# Patient Record
Sex: FEMALE | Race: BLACK OR AFRICAN AMERICAN | NOT HISPANIC OR LATINO | Employment: OTHER | URBAN - METROPOLITAN AREA
[De-identification: names, ages, dates, MRNs, and addresses within clinical notes are randomized per-mention and may not be internally consistent; named-entity substitution may affect disease eponyms.]

---

## 2017-01-12 ENCOUNTER — PATIENT MESSAGE (OUTPATIENT)
Dept: HEPATOLOGY | Facility: CLINIC | Age: 57
End: 2017-01-12

## 2017-02-14 ENCOUNTER — PATIENT MESSAGE (OUTPATIENT)
Dept: HEPATOLOGY | Facility: CLINIC | Age: 57
End: 2017-02-14

## 2017-02-27 ENCOUNTER — PATIENT MESSAGE (OUTPATIENT)
Dept: HEPATOLOGY | Facility: CLINIC | Age: 57
End: 2017-02-27

## 2017-03-01 ENCOUNTER — HISTORICAL (OUTPATIENT)
Dept: RADIOLOGY | Facility: HOSPITAL | Age: 57
End: 2017-03-01

## 2017-03-01 ENCOUNTER — PATIENT MESSAGE (OUTPATIENT)
Dept: HEPATOLOGY | Facility: CLINIC | Age: 57
End: 2017-03-01

## 2017-03-01 LAB
ALBUMIN/GLOB SERPL ELPH: 1.2 {RATIO}
ALBUMIN: 3.9
ALP ISOS SERPL LEV INH-CCNC: 102 U/L
ALT SERPL-CCNC: 59 U/L
AST SERPL-CCNC: 30 U/L
BASOPHILS ABSOLUTE COUNT: 0 /ΜL
BASOPHILS NFR BLD: 0 % (ref 0–3)
BILIRUB CONJ+UNCONJ SERPL-MCNC: 0.2 MG/DL
BILIRUBIN DIRECT+TOT PNL SERPL-MCNC: 0.1 MG/DL (ref 0.01–0.4)
BILIRUBIN, TOTAL: 0.3
BUN BLD-MCNC: 15 MG/DL (ref 4–21)
CALCIUM SERPL-MCNC: 9.1 MG/DL
CHLORIDE: 105
CO2 SERPL-SCNC: 27 MMOL/L
CREAT SERPL-MCNC: 0.7 MG/DL
EOSINOPHIL NFR BLD: 8 %
EOSINOPHILS ABSOLUTE COUNT: 1 /ΜL
ERYTHROCYTE [DISTWIDTH] IN BLOOD BY AUTOMATED COUNT: 13.6 % (ref 11.5–14.5)
EST. GFR  (AFRICAN AMERICAN): >60
EST. GFR  (NON AFRICAN AMERICAN): 60 MG/DL
GLOBULIN: 3.3
GLUCOSE: 109 (ref 14–106)
HCT VFR BLD AUTO: 40 % (ref 36–46)
HGB BLD-MCNC: 13 G/DL (ref 12–16)
LYMPHOCYTES %: 54 % (ref 18–52)
LYMPHOCYTES ABSOLUTE COUNT: 3 /ΜL
MCH RBC QN AUTO: 29.6 PG (ref 26–34)
MCHC RBC AUTO-ENTMCNC: 33 G/DL (ref 30–37)
MCV RBC AUTO: 90 FL (ref 82–108)
MONOCYTES %: 5
MONOCYTES ABSOLUTE COUNT: 0.3
NEUTROPHILS ABSOLUTE COUNT: 2 /ΜL (ref 2–9)
NEUTROPHILS ABSOLUTE COUNT: 2 /ΜL (ref 2–9)
NEUTROPHILS RELATIVE PERCENT: 33 % (ref 46–78)
PLATELET # BLD AUTO: 338 K/ΜL (ref 150–399)
PMV BLD AUTO: 8.6 FL (ref 9.4–12.4)
POTASSIUM: 4.3
PROT SERPL-MCNC: 7.2 G/DL
RBC # BLD AUTO: 4.39 10^6/ΜL (ref 4–5.2)
SODIUM: 142
WBC # BLD AUTO: 5.8 10^3/ML

## 2017-03-02 ENCOUNTER — TELEPHONE (OUTPATIENT)
Dept: HEPATOLOGY | Facility: CLINIC | Age: 57
End: 2017-03-02

## 2017-03-02 LAB — AFP-TUMOR MARKER: 3.3

## 2017-03-02 NOTE — TELEPHONE ENCOUNTER
Patient sent over information to be added to chart.     Hep A and B Injection    1st Injection- 05/17/2012  2nd Injection- 06/18/2012  3rd Injection- 11/16/2012     Injection received @ [facility].   Copy sent to scanning.

## 2017-03-02 NOTE — TELEPHONE ENCOUNTER
----- Message from Bell Quintanilla MD sent at 3/2/2017 12:50 AM CST -----  Labs stable. Inform patient

## 2017-03-03 ENCOUNTER — TELEPHONE (OUTPATIENT)
Dept: HEPATOLOGY | Facility: CLINIC | Age: 57
End: 2017-03-03

## 2017-03-03 NOTE — TELEPHONE ENCOUNTER
----- Message from Bell Quintanilla MD sent at 3/3/2017  3:50 AM CST -----  Please inform patient results are OK.

## 2017-03-06 ENCOUNTER — TELEPHONE (OUTPATIENT)
Dept: HEPATOLOGY | Facility: CLINIC | Age: 57
End: 2017-03-06

## 2017-03-06 ENCOUNTER — PATIENT MESSAGE (OUTPATIENT)
Dept: HEPATOLOGY | Facility: CLINIC | Age: 57
End: 2017-03-06

## 2017-03-06 NOTE — TELEPHONE ENCOUNTER
Reviewed ultrasound report dated March 1, 2017:  Please inform patient his ultrasound showed fatty liver, no tumor in the liver, blood flow to the liver is appropriate, gallbladder is unremarkable, spleen is normal size, both kidneys were normal size.

## 2017-03-06 NOTE — TELEPHONE ENCOUNTER
MA called patient back, inform patient of her US results     Bell Quintanilla MD   3/6/17 9:04 AM   Note      Reviewed ultrasound report dated March 1, 2017:  Please inform patient his ultrasound showed fatty liver, no tumor in the liver, blood flow to the liver is appropriate, gallbladder is unremarkable, spleen is normal size, both kidneys were normal size.           Patient understood and verbalized understanding.DON

## 2017-03-06 NOTE — TELEPHONE ENCOUNTER
----- Message from Karissa Flores sent at 3/6/2017 10:08 AM CST -----  Contact: pt  Wants to know results of US and other tests from last week please call her asap @ # [phone].

## 2017-03-09 ENCOUNTER — TELEPHONE (OUTPATIENT)
Dept: HEPATOLOGY | Facility: CLINIC | Age: 57
End: 2017-03-09

## 2017-03-09 NOTE — TELEPHONE ENCOUNTER
Sent message below to patient.     Slight increase in tumor marker is not significant.  Once a year tumor marker measurement and ultrasound should be sufficient. Your test showed bridging fibrosis which is one notch below cirrhosis, so it does not need to be done every 6 months.  It was my oversight and I asked for it at 6 months after the last. But next one will be a year after your last one, in March 2018.      So we will do AFP and u/s once a year, next will be in March 2018. Pl flag her chart.

## 2017-03-30 ENCOUNTER — PATIENT MESSAGE (OUTPATIENT)
Dept: HEPATOLOGY | Facility: CLINIC | Age: 57
End: 2017-03-30

## 2017-05-15 ENCOUNTER — HISTORICAL (OUTPATIENT)
Dept: LAB | Facility: HOSPITAL | Age: 57
End: 2017-05-15

## 2017-07-31 ENCOUNTER — TELEPHONE (OUTPATIENT)
Dept: HEPATOLOGY | Facility: CLINIC | Age: 57
End: 2017-07-31

## 2017-07-31 NOTE — TELEPHONE ENCOUNTER
MA called patient back, schedule her follow up visit [facility] location, patient need labs and US done before her follow up visit.     Mailed appt reminder, labs and US order to patient. Peña

## 2017-07-31 NOTE — TELEPHONE ENCOUNTER
----- Message from Neda Worrell sent at 7/31/2017  8:28 AM CDT -----  Contact: patient   Patient calling to schedule her appt with [facility] in [place]. Please call  or call

## 2017-09-21 ENCOUNTER — HISTORICAL (OUTPATIENT)
Dept: RADIOLOGY | Facility: HOSPITAL | Age: 57
End: 2017-09-21

## 2017-09-21 LAB
BASOPHILS ABSOLUTE COUNT: 0 /ΜL
BASOPHILS NFR BLD: 0 % (ref 0–3)
EOSINOPHIL NFR BLD: 10 %
EOSINOPHILS ABSOLUTE COUNT: 1 /ΜL
ERYTHROCYTE [DISTWIDTH] IN BLOOD BY AUTOMATED COUNT: 13.7 %
HCT VFR BLD AUTO: 41 % (ref 36–46)
HGB BLD-MCNC: 13.2 G/DL (ref 12–16)
LYMPHOCYTES %: 49 % (ref 18–52)
LYMPHOCYTES ABSOLUTE COUNT: 3 /ΜL
MCH RBC QN AUTO: 29.1 PG
MCHC RBC AUTO-ENTMCNC: 32.4 G/DL
MCV RBC AUTO: 89.9 FL
MONOCYTES %: 6
MONOCYTES ABSOLUTE COUNT: 0.4
NEUTROPHILS - MAN (DIFF): 34
NEUTROPHILS ABSOLUTE COUNT: 2 /ΜL
NEUTROPHILS ABSOLUTE COUNT: 2 /ΜL
PLATELET # BLD AUTO: 320 K/ΜL (ref 150–399)
PMV BLD AUTO: 8.3 FL (ref 7.5–11.5)
RBC # BLD AUTO: 4.54 10^6/ΜL (ref 4–5.2)
WBC: 5.6

## 2017-09-25 ENCOUNTER — TELEPHONE (OUTPATIENT)
Dept: HEPATOLOGY | Facility: CLINIC | Age: 57
End: 2017-09-25

## 2017-09-25 LAB
ALBUMIN/GLOB SERPL ELPH: 1.1 {RATIO}
ALBUMIN: 3.8
ALP ISOS SERPL LEV INH-CCNC: 100 U/L
ALT SERPL-CCNC: 49 U/L
AST SERPL-CCNC: 27 U/L
BILIRUB CONJ+UNCONJ SERPL-MCNC: 0.2 MG/DL
BILIRUBIN DIRECT+TOT PNL SERPL-MCNC: 0.1 MG/DL (ref 0.01–0.4)
BILIRUBIN, TOTAL: 0.3
BUN BLD-MCNC: 15 MG/DL (ref 4–21)
CALCIUM SERPL-MCNC: 9.4 MG/DL
CARBON DIOXIDE, CO2: 26
CHLORIDE: 107
CREAT SERPL-MCNC: 0.7 MG/DL
GFR MDRD AF AMER: >60
GFR MDRD NON AF AMER: >60
GLOBULIN: 3.4
GLUCOSE: 144
INR PPP: 1.1 (ref 2–3)
POTASSIUM: 4.2
PROT SERPL-MCNC: 7.2 G/DL
PROTHROMBIN TIME: 14 S
SODIUM: 141

## 2017-09-25 NOTE — TELEPHONE ENCOUNTER
----- Message from Bell Quintanilla MD sent at 9/25/2017  3:55 PM CDT -----  Please inform patient:  Liver enzyme is minimally elevated, but slightly lower than previously.  No change needed

## 2017-09-28 ENCOUNTER — OFFICE VISIT (OUTPATIENT)
Dept: HEPATOLOGY | Facility: CLINIC | Age: 57
End: 2017-09-28
Payer: COMMERCIAL

## 2017-09-28 VITALS
BODY MASS INDEX: 34.21 KG/M2 | DIASTOLIC BLOOD PRESSURE: 93 MMHG | RESPIRATION RATE: 18 BRPM | HEIGHT: 69 IN | SYSTOLIC BLOOD PRESSURE: 135 MMHG | WEIGHT: 231 LBS

## 2017-09-28 DIAGNOSIS — K75.81 NONALCOHOLIC STEATOHEPATITIS: Primary | ICD-10-CM

## 2017-09-28 PROCEDURE — 3008F BODY MASS INDEX DOCD: CPT | Mod: S$GLB,,, | Performed by: INTERNAL MEDICINE

## 2017-09-28 PROCEDURE — 99212 OFFICE O/P EST SF 10 MIN: CPT | Mod: S$GLB,,, | Performed by: INTERNAL MEDICINE

## 2017-09-28 NOTE — PROGRESS NOTES
Subjective:       Patient ID: Jessie Greene is a 56 y.o. female.    Chief Complaint: Follow-up      HPI:     CORBETT, sec to DM.  on insulin.  Had bridging fibrosis in the liver.  Has been on HCC surveillance once a year.  AFP normal. U/s done on 9/12/16: report not sent to me.      Symptoms are occasional muscular pains involving shoulders and chest.  Occasional right upper quadrant pain with sometimes shoots downwards towards her lower abdomen, sometimes left upper quadrant pain radiating downwards to the left lower abdomen.  No constipation       Review of Systems   Constitutional: Negative for appetite change, fatigue and unexpected weight change.   HENT: Negative for dental problem, nosebleeds, sore throat and trouble swallowing.    Eyes: Negative for visual disturbance.   Respiratory: Negative for cough and shortness of breath.    Cardiovascular: Negative for leg swelling.   Gastrointestinal: Negative for abdominal distention and diarrhea.   Genitourinary: Negative for decreased urine volume and frequency.   Musculoskeletal: Positive for myalgias. Negative for joint swelling.   Skin: Negative for color change and rash.   Neurological: Negative for dizziness and weakness.   Hematological: Does not bruise/bleed easily.   Psychiatric/Behavioral: Negative for confusion and decreased concentration. The patient is not nervous/anxious.        Objective:      Physical Exam   Constitutional: She is oriented to person, place, and time. Vital signs are normal. She appears well-developed and well-nourished.   HENT:   Head: Normocephalic.   Eyes: Conjunctivae are normal. No scleral icterus.   Neck: No JVD present.   Abdominal: Soft. She exhibits no distension.   Musculoskeletal: Normal range of motion. She exhibits no edema.   Neurological: She is alert and oriented to person, place, and time. Coordination normal.   Skin: No rash noted.   Psychiatric: She has a normal mood and affect. Her behavior is normal. Judgment  and thought content normal.       Assessment:    CORBETT with bridging fibrosis.- possible early cirrhosis.  Platelet count remains normal.   On HCC surveillance, getting AFP and U/ q 1 year.   Recent U/s showed no mass in the liver.    Alpha-fetoprotein normal   Neutropenia - 1.88, doubt it is from liver disease, as platelet count still normal.  Could be from viral illness.        Plan:   1.  PCP follow-up for control of blood glucose.    2.  Continue low carbohydrate diet, and regular exercise.     3.  Have blood count repeated from PCP to see if neutropenia resolved.  3.  AFP and u/s q 1 year.  Next in September 2018.  4.  Return 1 yr

## 2017-09-28 NOTE — PATIENT INSTRUCTIONS
1.  PCP follow-up for control of blood glucose.    2.  Continue low carbohydrate diet, and regular exercise.     3.  Have blood count repeated from PCP to see if neutropenia resolved.  3.  AFP and u/s q 1 year.  Next in September 2018.  4.  Return 1 yr

## 2018-04-30 ENCOUNTER — PATIENT MESSAGE (OUTPATIENT)
Dept: HEPATOLOGY | Facility: CLINIC | Age: 58
End: 2018-04-30

## 2018-05-06 ENCOUNTER — HISTORICAL (OUTPATIENT)
Dept: LAB | Facility: HOSPITAL | Age: 58
End: 2018-05-06

## 2018-07-05 ENCOUNTER — PATIENT MESSAGE (OUTPATIENT)
Dept: HEPATOLOGY | Facility: CLINIC | Age: 58
End: 2018-07-05

## 2018-07-10 ENCOUNTER — TELEPHONE (OUTPATIENT)
Dept: HEPATOLOGY | Facility: CLINIC | Age: 58
End: 2018-07-10

## 2018-07-10 DIAGNOSIS — K75.81 NASH (NONALCOHOLIC STEATOHEPATITIS): Primary | ICD-10-CM

## 2018-07-13 ENCOUNTER — TELEPHONE (OUTPATIENT)
Dept: HEPATOLOGY | Facility: CLINIC | Age: 58
End: 2018-07-13

## 2018-07-13 NOTE — TELEPHONE ENCOUNTER
----- Message from Yara Fiore sent at 7/13/2018 10:50 AM CDT -----  Contact: [employer] message  Appointment Request From: Jessie Greene    With Provider: Bell Quintanilla MD [[place] - Hepatology]    Would Accept With:Only the person I've selected    Preferred Date Range: From 9/3/2018 To 9/14/2018    Preferred Times: Monday Morning, Tuesday Morning, Wednesday Morning, Thursday Morning, Friday Morning    Reason for visit: Request an Appt    Comments:  Yearly visit

## 2018-07-13 NOTE — TELEPHONE ENCOUNTER
MA attempted to call patient back, she is unable to reached left her Vm to please give us a callback.

## 2018-07-16 ENCOUNTER — PATIENT MESSAGE (OUTPATIENT)
Dept: HEPATOLOGY | Facility: CLINIC | Age: 58
End: 2018-07-16

## 2018-07-16 ENCOUNTER — TELEPHONE (OUTPATIENT)
Dept: HEPATOLOGY | Facility: CLINIC | Age: 58
End: 2018-07-16

## 2018-08-06 ENCOUNTER — TELEPHONE (OUTPATIENT)
Dept: HEPATOLOGY | Facility: CLINIC | Age: 58
End: 2018-08-06

## 2018-08-06 NOTE — TELEPHONE ENCOUNTER
----- Message from Kalani New sent at 8/6/2018  8:13 AM CDT -----  Contact: Chery diaz/Dimitris Henley [phone]  Needs orders for US of abdomen faxed to [phone]/pt scheduled for tomorrow

## 2018-08-07 ENCOUNTER — HISTORICAL (OUTPATIENT)
Dept: RADIOLOGY | Facility: HOSPITAL | Age: 58
End: 2018-08-07

## 2018-08-07 LAB
AFP-TUMOR MARKER: 3.2
ALBUMIN/GLOB SERPL ELPH: 1 {RATIO}
ALBUMIN: 3.9
ALP SERPL-CCNC: 92 U/L (ref 25–125)
ALT SERPL W P-5'-P-CCNC: 59 U/L (ref 7–35)
AST SERPL-CCNC: 32 U/L (ref 13–35)
BASOPHILS NFR BLD: 0 % (ref 0–3)
BILIRUB CONJ+UNCONJ SERPL-MCNC: 0.2 MG/DL
BILIRUB SERPL-MCNC: 0.3 MG/DL (ref 0.1–1.4)
BILIRUBIN DIRECT+TOT PNL SERPL-MCNC: 0.1 MG/DL (ref 0.01–0.4)
BUN SERPL-MCNC: 14 MG/DL
CALCIUM SERPL-MCNC: 9.2 MG/DL (ref 8.7–10.7)
CHLORIDE SERPL-SCNC: 106 MMOL/L (ref 99–108)
CO2 SERPL-SCNC: 27 MMOL/L (ref 13–22)
CREAT SERPL-MCNC: 0.8 MG/DL (ref 0.5–1.1)
EGFR IF AFRICAN AMERICAN: >60
EOSINOPHIL NFR BLD: 3 %
ERYTHROCYTE [DISTWIDTH] IN BLOOD BY AUTOMATED COUNT: 14.1 %
EST. GFR  (NON AFRICAN AMERICAN): 60 MG/DL
GLOBULIN: 4
GLUCOSE SERPL-MCNC: 138 MG/DL
HCT VFR BLD AUTO: 41 % (ref 36–46)
HGB BLD-MCNC: 13.4 G/DL (ref 12–16)
INR PPP: 1.1 (ref 2–3)
LYMPHOCYTES - MAN (DIFF): 56
MCH RBC QN AUTO: 29.8 PG
MCHC RBC AUTO-ENTMCNC: 32.8 G/DL
MCV RBC AUTO: 91.1 FL
MONOCYTES %: 4
NEUTROPHILS ABSOLUTE COUNT: 2 /ΜL
PLATELET # BLD AUTO: 315 K/ΜL (ref 150–399)
PMV BLD AUTO: 8.5 FL (ref 7.5–11.5)
POTASSIUM SERPL-SCNC: 4.3 MMOL/L (ref 3.4–5.3)
PT: 11
RBC MORPH BLD: NORMAL
RBC: 4.49
SMALL PLATELETS BLD QL SMEAR: ADEQUATE
TICARCILLIN+CLAVULANATE [SUSCEPTIBILITY] BY DISK DIFFUSION (KB): 37 % (ref 36–72)
TOTAL PROTEIN: 7.5 G/DL (ref 6.4–8.2)
WBC: 6.4

## 2018-08-09 ENCOUNTER — TELEPHONE (OUTPATIENT)
Dept: HEPATOLOGY | Facility: CLINIC | Age: 58
End: 2018-08-09

## 2018-08-09 NOTE — TELEPHONE ENCOUNTER
----- Message from Rody Ramon sent at 8/9/2018  9:24 AM CDT -----      ----- Message -----  From: Bell Quintanilla MD  Sent: 8/9/2018   7:50 AM  To: [facility]    Labs stable. No change needed.

## 2018-08-09 NOTE — TELEPHONE ENCOUNTER
----- Message from Bell Quintanilla MD sent at 8/9/2018  7:51 AM CDT -----  Tumor marker normal. Pl inform pt.

## 2018-11-28 ENCOUNTER — TELEPHONE (OUTPATIENT)
Dept: HEPATOLOGY | Facility: CLINIC | Age: 58
End: 2018-11-28

## 2018-11-28 NOTE — TELEPHONE ENCOUNTER
Reviewed abdominal ultrasound dated August 7, 2018:  Liver enlarged, no focal liver lesions seen. Spleen normal in size gallbladder free of stones.  No fluid in the abdomen.    Please inform patient ultrasound did not show any significant abnormalities.

## 2019-01-31 ENCOUNTER — HISTORICAL (OUTPATIENT)
Dept: LAB | Facility: HOSPITAL | Age: 59
End: 2019-01-31

## 2019-05-18 ENCOUNTER — HISTORICAL (OUTPATIENT)
Dept: LAB | Facility: HOSPITAL | Age: 59
End: 2019-05-18

## 2019-05-18 LAB
EST. AVERAGE GLUCOSE BLD GHB EST-MCNC: 169 MG/DL
HBA1C MFR BLD: 7.5 % (ref 4.2–6.3)

## 2019-05-20 ENCOUNTER — PATIENT MESSAGE (OUTPATIENT)
Dept: HEPATOLOGY | Facility: CLINIC | Age: 59
End: 2019-05-20

## 2019-05-21 ENCOUNTER — PATIENT MESSAGE (OUTPATIENT)
Dept: HEPATOLOGY | Facility: CLINIC | Age: 59
End: 2019-05-21

## 2019-05-28 ENCOUNTER — HISTORICAL (OUTPATIENT)
Dept: RADIOLOGY | Facility: HOSPITAL | Age: 59
End: 2019-05-28

## 2019-05-28 ENCOUNTER — HISTORICAL (OUTPATIENT)
Dept: ADMINISTRATIVE | Facility: HOSPITAL | Age: 59
End: 2019-05-28

## 2019-05-28 LAB
ABS NEUT (OLG): 3.19 X10(3)/MCL (ref 2.1–9.2)
ALBUMIN SERPL-MCNC: 3.7 GM/DL (ref 3.4–5)
ALBUMIN/GLOB SERPL: 1.1 {RATIO}
ALP SERPL-CCNC: 120 UNIT/L (ref 38–126)
ALT SERPL-CCNC: 49 UNIT/L (ref 12–78)
AST SERPL-CCNC: 25 UNIT/L (ref 15–37)
BASOPHILS # BLD AUTO: 0 X10(3)/MCL (ref 0–0.2)
BASOPHILS NFR BLD AUTO: 0 %
BILIRUB SERPL-MCNC: 0.4 MG/DL (ref 0.2–1)
BILIRUBIN DIRECT+TOT PNL SERPL-MCNC: 0.1 MG/DL (ref 0–0.2)
BILIRUBIN DIRECT+TOT PNL SERPL-MCNC: 0.3 MG/DL (ref 0–0.8)
BUN SERPL-MCNC: 19 MG/DL (ref 7–18)
CALCIUM SERPL-MCNC: 9.2 MG/DL (ref 8.5–10.1)
CHLORIDE SERPL-SCNC: 105 MMOL/L (ref 98–107)
CHOLEST SERPL-MCNC: 103 MG/DL (ref 0–200)
CHOLEST/HDLC SERPL: 2.9 {RATIO} (ref 0–4)
CO2 SERPL-SCNC: 26 MMOL/L (ref 21–32)
CREAT SERPL-MCNC: 1.08 MG/DL (ref 0.55–1.02)
EOSINOPHIL # BLD AUTO: 0.5 X10(3)/MCL (ref 0–0.9)
EOSINOPHIL NFR BLD AUTO: 7 %
ERYTHROCYTE [DISTWIDTH] IN BLOOD BY AUTOMATED COUNT: 13.6 % (ref 11.5–17)
GLOBULIN SER-MCNC: 3.5 GM/DL (ref 2.4–3.5)
GLUCOSE SERPL-MCNC: 108 MG/DL (ref 74–106)
HCT VFR BLD AUTO: 40.3 % (ref 37–47)
HDLC SERPL-MCNC: 35 MG/DL (ref 35–60)
HGB BLD-MCNC: 12.5 GM/DL (ref 12–16)
INR PPP: 1 (ref 0–1.3)
LDLC SERPL CALC-MCNC: 48 MG/DL (ref 0–129)
LYMPHOCYTES # BLD AUTO: 3.2 X10(3)/MCL (ref 0.6–4.6)
LYMPHOCYTES NFR BLD AUTO: 43 %
MCH RBC QN AUTO: 29 PG (ref 27–31)
MCHC RBC AUTO-ENTMCNC: 31 GM/DL (ref 33–36)
MCV RBC AUTO: 93.5 FL (ref 80–94)
MONOCYTES # BLD AUTO: 0.5 X10(3)/MCL (ref 0.1–1.3)
MONOCYTES NFR BLD AUTO: 7 %
NEUTROPHILS # BLD AUTO: 3.19 X10(3)/MCL (ref 2.1–9.2)
NEUTROPHILS NFR BLD AUTO: 43 %
PLATELET # BLD AUTO: 377 X10(3)/MCL (ref 130–400)
PMV BLD AUTO: 8.6 FL (ref 9.4–12.4)
POTASSIUM SERPL-SCNC: 4.2 MMOL/L (ref 3.5–5.1)
PROT SERPL-MCNC: 7.2 GM/DL (ref 6.4–8.2)
PROTHROMBIN TIME: 13.7 SECOND(S) (ref 12–14)
RBC # BLD AUTO: 4.31 X10(6)/MCL (ref 4.2–5.4)
SODIUM SERPL-SCNC: 139 MMOL/L (ref 136–145)
TRIGL SERPL-MCNC: 101 MG/DL (ref 30–150)
VLDLC SERPL CALC-MCNC: 20 MG/DL
WBC # SPEC AUTO: 7.4 X10(3)/MCL (ref 4.5–11.5)

## 2019-05-31 ENCOUNTER — TELEPHONE (OUTPATIENT)
Dept: HEPATOLOGY | Facility: CLINIC | Age: 59
End: 2019-05-31

## 2019-05-31 LAB
AFP: 2.6
ALBUMIN/GLOB SERPL ELPH: 1.1 {RATIO}
ALBUMIN: 3.7
ALP ISOS SERPL LEV INH-CCNC: 120 U/L
ALT SERPL-CCNC: 49 U/L
AST: 25
BASOPHILS ABSOLUTE COUNT: 0 /?L
BASOPHILS NFR BLD: 0 % (ref 0–3)
BILIRUB CONJ+UNCONJ SERPL-MCNC: 0.3 MG/DL
BILIRUBIN DIRECT+TOT PNL SERPL-MCNC: 0.1 MG/DL (ref 0.01–0.4)
BILIRUBIN TOTAL: 0.4
BUN BLD-MCNC: 19 MG/DL (ref 4–21)
CALCIUM SERPL-MCNC: 9.2 MG/DL
CARBON DIOXIDE, CO2: 26
CHLORIDE: 105
CHOL/HDLC RATIO: 2.9
CHOLEST SERPL-MSCNC: 103 MG/DL (ref 0–200)
CREAT SERPL-MCNC: 1.1 MG/DL
EGFR IF AFRICAN AMERICAN: >60
EOSINOPHIL NFR BLD: 7 %
EOSINOPHILS ABSOLUTE COUNT: 1 /?L
ERYTHROCYTE [DISTWIDTH] IN BLOOD BY AUTOMATED COUNT: 13.6 %
EST. GFR  (NON AFRICAN AMERICAN): 56 MG/DL
GLOBULIN: 3.5
GLUCOSE: 108
HCT: 40.3
HDLC SERPL-MCNC: 35 MG/DL
HGB BLD-MCNC: 12.5 G/DL (ref 12–16)
INR PPP: 1 (ref 2–3)
LDLC SERPL CALC-MCNC: 48 MG/DL
LYMPHOCYTES %: 43 % (ref 18–52)
LYMPHOCYTES ABSOLUTE COUNT: 3 /?L
MCH RBC QN AUTO: 29 PG
MCHC RBC AUTO-ENTMCNC: 31 G/DL
MCV RBC AUTO: 93.5 FL
MONOCYTES %: 7
MONOCYTES ABSOLUTE COUNT: 0.5
NEUTROPHILS - ABS (DIFF): 3 /ΜL
NEUTROPHILS - MAN (DIFF): 43
NEUTROPHILS ABSOLUTE COUNT: 3 /?L
PLATELET COUNT: 377
PMV BLD AUTO: 8.6 FL (ref 7.5–11.5)
POTASSIUM: 4.2
PROTEIN TOTAL: 7.2
PT: 13.7
RBC # BLD AUTO: 4.31 10*6/UL
SODIUM: 139
TRIGL SERPL-MCNC: 101 MG/DL
VLDL CHOLESTEROL: 20 MG/DL
WBC: 7.4

## 2019-05-31 NOTE — TELEPHONE ENCOUNTER
----- Message from Bell Quintanilla MD sent at 5/31/2019 12:59 PM CDT -----  Please inform patient results are OK.

## 2019-06-03 ENCOUNTER — PATIENT MESSAGE (OUTPATIENT)
Dept: HEPATOLOGY | Facility: CLINIC | Age: 59
End: 2019-06-03

## 2019-06-06 ENCOUNTER — PATIENT MESSAGE (OUTPATIENT)
Dept: HEPATOLOGY | Facility: CLINIC | Age: 59
End: 2019-06-06

## 2019-06-09 ENCOUNTER — TELEPHONE (OUTPATIENT)
Dept: HEPATOLOGY | Facility: CLINIC | Age: 59
End: 2019-06-09

## 2019-06-09 NOTE — TELEPHONE ENCOUNTER
Reviewed u/s 5/28/19  No tumor in the liver.  Continue surveillance for liver cancer every 6 months with u/s and AFP, next due 11/2019.    Also, let patient know:  Pancreatic duct prominent 3 mm. Have patient contact PCP to get further evaluation of this finding.

## 2019-07-11 ENCOUNTER — HISTORICAL (OUTPATIENT)
Dept: RADIOLOGY | Facility: HOSPITAL | Age: 59
End: 2019-07-11

## 2019-07-12 ENCOUNTER — PATIENT MESSAGE (OUTPATIENT)
Dept: HEPATOLOGY | Facility: CLINIC | Age: 59
End: 2019-07-12

## 2019-08-13 ENCOUNTER — PATIENT MESSAGE (OUTPATIENT)
Dept: HEPATOLOGY | Facility: CLINIC | Age: 59
End: 2019-08-13

## 2019-09-26 ENCOUNTER — OFFICE VISIT (OUTPATIENT)
Dept: HEPATOLOGY | Facility: CLINIC | Age: 59
End: 2019-09-26
Payer: COMMERCIAL

## 2019-09-26 VITALS
HEIGHT: 69 IN | WEIGHT: 220 LBS | RESPIRATION RATE: 18 BRPM | BODY MASS INDEX: 32.58 KG/M2 | SYSTOLIC BLOOD PRESSURE: 119 MMHG | HEART RATE: 77 BPM | DIASTOLIC BLOOD PRESSURE: 73 MMHG

## 2019-09-26 DIAGNOSIS — K75.81 NASH (NONALCOHOLIC STEATOHEPATITIS): Primary | ICD-10-CM

## 2019-09-26 PROCEDURE — 99214 PR OFFICE/OUTPT VISIT, EST, LEVL IV, 30-39 MIN: ICD-10-PCS | Mod: S$GLB,,, | Performed by: INTERNAL MEDICINE

## 2019-09-26 PROCEDURE — 99214 OFFICE O/P EST MOD 30 MIN: CPT | Mod: S$GLB,,, | Performed by: INTERNAL MEDICINE

## 2019-09-26 RX ORDER — CLONAZEPAM 0.5 MG/1
0.5 TABLET ORAL 2 TIMES DAILY PRN
COMMUNITY
End: 2022-05-03

## 2019-09-26 RX ORDER — FLUTICASONE FUROATE AND VILANTEROL 100; 25 UG/1; UG/1
1 POWDER RESPIRATORY (INHALATION) DAILY
COMMUNITY
End: 2022-05-03

## 2019-09-26 RX ORDER — TELMISARTAN 80 MG/1
40 TABLET ORAL DAILY
COMMUNITY
End: 2022-05-03

## 2019-09-26 RX ORDER — BUPROPION HYDROCHLORIDE 150 MG/1
150 TABLET ORAL DAILY
COMMUNITY
End: 2022-07-05

## 2019-09-26 RX ORDER — FLUOXETINE HYDROCHLORIDE 40 MG/1
40 CAPSULE ORAL 2 TIMES DAILY
COMMUNITY

## 2019-09-26 NOTE — Clinical Note
-  Continue low carbohydrate diet, and regular exercise.   -  Have blood count today, as we did not get plt ct last time. -  CBC, CMP q 6 months, next in November 2019.   -  AFP and u/s q 1 year.  Next in May 2020.-  Return 1 yr

## 2019-09-26 NOTE — PATIENT INSTRUCTIONS
-  Continue low carbohydrate diet, and regular exercise.     -  Have blood count today, as we did not get plt ct last time.   -  CBC, CMP q 6 months, next in November 2019.     -  AFP and u/s q 1 year.  Next in May 2020.  -  Return 1 yr

## 2019-10-01 ENCOUNTER — TELEPHONE (OUTPATIENT)
Dept: HEPATOLOGY | Facility: CLINIC | Age: 59
End: 2019-10-01

## 2019-10-01 LAB
AGE: 57
ALBUMIN: 4
ALP ISOS SERPL LEV INH-CCNC: 116 U/L
ALT: 38
ANION GAP SERPL CALC-SCNC: 11 MMOL/L
AST: 20
BASOPHILS ABSOLUTE COUNT: 0 /?L
BASOPHILS NFR BLD: 1 % (ref 0–3)
BUN BLD-MCNC: 16 MG/DL (ref 4–21)
CALCIUM SERPL-MCNC: 9.8 MG/DL
CARBON DIOXIDE, CO2: 24
CHLORIDE: 105
CREAT SERPL-MCNC: 1 MG/DL
EGFR IF AFRICAN AMERICAN: 60
EOS COUNT ABSOLUTE: 1 /ΜL
EOSINOPHIL NFR BLD: 8.2 %
EST. GFR  (NON AFRICAN AMERICAN): 56 MG/DL
GFR MDRD AF AMER: 68
GFR: 56
GLUCOSE: 184
HCT: 41.6
HGB: 13.6
IMMATURE GRANS (ABS): 0.02
IMMATURE GRANULOCYTES: 0.3
LYMPH %: 46.1
LYMPHOCYTES ABSOLUTE COUNT: 3 /?L
MCH RBC QN AUTO: 30.7 PG
MCHC RBC AUTO-ENTMCNC: 32.7 G/DL
MCV RBC AUTO: 93.9 FL
MONO %: 5.2
MONOCYTES ABSOLUTE COUNT: 0.32
NEUTROPHILS ABSOLUTE COUNT: 2 /?L
NEUTROPHILS NFR BLD: 39.6 %
NRBC %: 0
PLATELET # BLD AUTO: 345 K/?L (ref 150–399)
POTASSIUM: 3.87
RBC # BLD AUTO: 4.43 10*6/UL
RDW-CV: 13.6
RDW-SD: 47
SODIUM: 140
TOTAL BILIRUBIN POC: 0.3
TOTAL PROTEIN: 7.5 G/DL (ref 6.4–8.2)
WBC: 6.21

## 2019-10-01 NOTE — TELEPHONE ENCOUNTER
----- Message from Bell Quintanilla MD sent at 10/1/2019  3:14 PM CDT -----  Please inform patient results are OK.

## 2019-10-27 ENCOUNTER — PATIENT MESSAGE (OUTPATIENT)
Dept: HEPATOLOGY | Facility: CLINIC | Age: 59
End: 2019-10-27

## 2019-11-06 ENCOUNTER — HISTORICAL (OUTPATIENT)
Dept: ADMINISTRATIVE | Facility: HOSPITAL | Age: 59
End: 2019-11-06

## 2019-11-15 ENCOUNTER — HISTORICAL (OUTPATIENT)
Dept: RADIOLOGY | Facility: HOSPITAL | Age: 59
End: 2019-11-15

## 2019-12-31 ENCOUNTER — HISTORICAL (OUTPATIENT)
Dept: CARDIOLOGY | Facility: HOSPITAL | Age: 59
End: 2019-12-31

## 2020-07-14 ENCOUNTER — HISTORICAL (OUTPATIENT)
Dept: ADMINISTRATIVE | Facility: HOSPITAL | Age: 60
End: 2020-07-14

## 2020-08-10 LAB — CRC RECOMMENDATION EXT: NORMAL

## 2020-08-12 ENCOUNTER — HISTORICAL (OUTPATIENT)
Dept: ADMINISTRATIVE | Facility: HOSPITAL | Age: 60
End: 2020-08-12

## 2020-10-02 ENCOUNTER — HISTORICAL (OUTPATIENT)
Dept: RADIOLOGY | Facility: HOSPITAL | Age: 60
End: 2020-10-02

## 2021-04-14 ENCOUNTER — HISTORICAL (OUTPATIENT)
Dept: LAB | Facility: HOSPITAL | Age: 61
End: 2021-04-14

## 2021-04-16 ENCOUNTER — HISTORICAL (OUTPATIENT)
Dept: RADIOLOGY | Facility: HOSPITAL | Age: 61
End: 2021-04-16

## 2021-11-11 ENCOUNTER — HISTORICAL (OUTPATIENT)
Dept: RADIOLOGY | Facility: HOSPITAL | Age: 61
End: 2021-11-11

## 2022-02-09 ENCOUNTER — HISTORICAL (OUTPATIENT)
Dept: ADMINISTRATIVE | Facility: HOSPITAL | Age: 62
End: 2022-02-09

## 2022-02-09 LAB
ABS NEUT (OLG): 2.21 (ref 2.1–9.2)
ALBUMIN SERPL-MCNC: 3.7 G/DL (ref 3.4–4.8)
ALBUMIN/GLOB SERPL: 1.2 {RATIO} (ref 1.1–2)
ALP SERPL-CCNC: 97 U/L (ref 40–150)
ALT SERPL-CCNC: 60 U/L (ref 0–55)
AST SERPL-CCNC: 41 U/L (ref 5–34)
BASOPHILS # BLD AUTO: 0 10*3/UL (ref 0–0.2)
BASOPHILS NFR BLD AUTO: 0.5 %
BILIRUB SERPL-MCNC: 0.3 MG/DL
BILIRUBIN DIRECT+TOT PNL SERPL-MCNC: 0.1 (ref 0–0.8)
BILIRUBIN DIRECT+TOT PNL SERPL-MCNC: 0.2 (ref 0–0.5)
BUN SERPL-MCNC: 18 MG/DL (ref 9.8–20.1)
CALCIUM SERPL-MCNC: 9.6 MG/DL (ref 8.7–10.5)
CHLORIDE SERPL-SCNC: 105 MMOL/L (ref 98–107)
CO2 SERPL-SCNC: 26 MMOL/L (ref 23–31)
CREAT SERPL-MCNC: 0.97 MG/DL (ref 0.55–1.02)
EOSINOPHIL # BLD AUTO: 0.5 10*3/UL (ref 0–0.9)
EOSINOPHIL NFR BLD AUTO: 8.2 %
ERYTHROCYTE [DISTWIDTH] IN BLOOD BY AUTOMATED COUNT: 14.6 % (ref 11.5–17)
GLOBULIN SER-MCNC: 3 G/DL (ref 2.4–3.5)
GLUCOSE SERPL-MCNC: 151 MG/DL (ref 82–115)
HCT VFR BLD AUTO: 40.9 % (ref 37–47)
HEMOLYSIS INTERF INDEX SERPL-ACNC: 3
HGB BLD-MCNC: 12.9 G/DL (ref 12–16)
ICTERIC INTERF INDEX SERPL-ACNC: 0
LIPEMIC INTERF INDEX SERPL-ACNC: 1
LYMPHOCYTES # BLD AUTO: 2.8 10*3/UL (ref 0.6–4.6)
LYMPHOCYTES NFR BLD AUTO: 48.3 %
MANUAL DIFF? (OHS): NO
MCH RBC QN AUTO: 29.3 PG (ref 27–31)
MCHC RBC AUTO-ENTMCNC: 31.5 G/DL (ref 33–36)
MCV RBC AUTO: 92.7 FL (ref 80–94)
MONOCYTES # BLD AUTO: 0.2 10*3/UL (ref 0.1–1.3)
MONOCYTES NFR BLD AUTO: 4.3 %
NEUTROPHILS # BLD AUTO: 2.2 10*3/UL (ref 2.1–9.2)
NEUTROPHILS NFR BLD AUTO: 38.4 %
PLATELET # BLD AUTO: 326 10*3/UL (ref 130–400)
PMV BLD AUTO: 8.1 FL (ref 9.4–12.4)
POTASSIUM SERPL-SCNC: 4 MMOL/L (ref 3.5–5.1)
PROT SERPL-MCNC: 6.7 G/DL (ref 5.8–7.6)
RBC # BLD AUTO: 4.41 10*6/UL (ref 4.2–5.4)
SODIUM SERPL-SCNC: 142 MMOL/L (ref 136–145)
TSH SERPL-ACNC: 2.03 M[IU]/L (ref 0.35–4.94)
WBC # SPEC AUTO: 5.8 10*3/UL (ref 4.5–11.5)

## 2022-04-07 ENCOUNTER — HISTORICAL (OUTPATIENT)
Dept: ADMINISTRATIVE | Facility: HOSPITAL | Age: 62
End: 2022-04-07
Payer: COMMERCIAL

## 2022-04-23 VITALS
HEIGHT: 69 IN | WEIGHT: 207 LBS | OXYGEN SATURATION: 98 % | DIASTOLIC BLOOD PRESSURE: 70 MMHG | BODY MASS INDEX: 30.66 KG/M2 | SYSTOLIC BLOOD PRESSURE: 112 MMHG

## 2022-04-25 ENCOUNTER — HISTORICAL (OUTPATIENT)
Dept: ADMINISTRATIVE | Facility: HOSPITAL | Age: 62
End: 2022-04-25
Payer: COMMERCIAL

## 2022-04-27 DIAGNOSIS — C50.511 CARCINOMA OF LOWER OUTER QUADRANT OF BREAST, RIGHT: Primary | ICD-10-CM

## 2022-04-28 ENCOUNTER — HISTORICAL (OUTPATIENT)
Dept: ANESTHESIOLOGY | Facility: HOSPITAL | Age: 62
End: 2022-04-28
Payer: COMMERCIAL

## 2022-04-28 ENCOUNTER — HISTORICAL (OUTPATIENT)
Dept: ADMINISTRATIVE | Facility: HOSPITAL | Age: 62
End: 2022-04-28
Payer: COMMERCIAL

## 2022-05-02 ENCOUNTER — LAB VISIT (OUTPATIENT)
Dept: LAB | Facility: HOSPITAL | Age: 62
End: 2022-05-02
Payer: COMMERCIAL

## 2022-05-02 DIAGNOSIS — C50.511 CARCINOMA OF LOWER OUTER QUADRANT OF BREAST, RIGHT: ICD-10-CM

## 2022-05-02 DIAGNOSIS — C50.919 BREAST CANCER: ICD-10-CM

## 2022-05-02 LAB
ALP SERPL-CCNC: 100 UNIT/L (ref 40–150)
ALT SERPL-CCNC: 34 UNIT/L (ref 0–55)
AST SERPL-CCNC: 29 UNIT/L (ref 5–34)

## 2022-05-02 PROCEDURE — 84460 ALANINE AMINO (ALT) (SGPT): CPT

## 2022-05-02 PROCEDURE — 36415 COLL VENOUS BLD VENIPUNCTURE: CPT

## 2022-05-02 PROCEDURE — 84075 ASSAY ALKALINE PHOSPHATASE: CPT

## 2022-05-02 PROCEDURE — 84450 TRANSFERASE (AST) (SGOT): CPT

## 2022-05-03 DIAGNOSIS — C50.919 BREAST CANCER: Primary | ICD-10-CM

## 2022-05-03 PROBLEM — Z15.89 MONOALLELIC MUTATION OF ATM GENE: Status: ACTIVE | Noted: 2022-05-03

## 2022-05-03 PROBLEM — K57.92 DIVERTICULITIS: Status: ACTIVE | Noted: 2022-05-03

## 2022-05-03 PROBLEM — I10 HYPERTENSION: Status: ACTIVE | Noted: 2022-05-03

## 2022-05-03 PROBLEM — Z15.01 MONOALLELIC MUTATION OF ATM GENE: Status: ACTIVE | Noted: 2022-05-03

## 2022-05-03 PROBLEM — J45.909 ASTHMA: Status: ACTIVE | Noted: 2022-05-03

## 2022-05-03 PROBLEM — C50.519 MALIGNANT NEOPLASM OF LOWER-OUTER QUADRANT OF FEMALE BREAST: Status: ACTIVE | Noted: 2022-05-03

## 2022-05-03 PROBLEM — Z79.811 AROMATASE INHIBITOR USE: Status: ACTIVE | Noted: 2022-05-03

## 2022-05-03 PROBLEM — E04.1 THYROID NODULE: Status: ACTIVE | Noted: 2022-05-03

## 2022-05-03 PROBLEM — Z15.09 MONOALLELIC MUTATION OF ATM GENE: Status: ACTIVE | Noted: 2022-05-03

## 2022-05-03 RX ORDER — INSULIN GLARGINE AND LIXISENATIDE 100; 33 U/ML; UG/ML
INJECTION, SOLUTION SUBCUTANEOUS
COMMUNITY
Start: 2022-03-19 | End: 2023-11-08

## 2022-05-03 RX ORDER — LOSARTAN POTASSIUM 50 MG/1
25 TABLET ORAL DAILY
COMMUNITY
Start: 2022-02-22 | End: 2023-11-08

## 2022-05-03 RX ORDER — ASPIRIN 325 MG
50000 TABLET, DELAYED RELEASE (ENTERIC COATED) ORAL
COMMUNITY
Start: 2021-12-27 | End: 2022-07-05

## 2022-05-03 RX ORDER — POLYETHYLENE GLYCOL 3350 17 G/17G
17 POWDER, FOR SOLUTION ORAL
COMMUNITY
End: 2022-07-05

## 2022-05-03 RX ORDER — ANASTROZOLE 1 MG/1
1 TABLET ORAL
COMMUNITY
Start: 2021-04-27 | End: 2022-05-04 | Stop reason: SINTOL

## 2022-05-03 RX ORDER — KETOCONAZOLE 20 MG/G
CREAM TOPICAL
COMMUNITY
Start: 2021-08-23 | End: 2023-11-08

## 2022-05-04 ENCOUNTER — OFFICE VISIT (OUTPATIENT)
Dept: HEMATOLOGY/ONCOLOGY | Facility: CLINIC | Age: 62
End: 2022-05-04
Payer: COMMERCIAL

## 2022-05-04 VITALS
DIASTOLIC BLOOD PRESSURE: 75 MMHG | SYSTOLIC BLOOD PRESSURE: 107 MMHG | HEART RATE: 64 BPM | OXYGEN SATURATION: 98 % | BODY MASS INDEX: 32.88 KG/M2 | WEIGHT: 222 LBS | HEIGHT: 69 IN | TEMPERATURE: 98 F

## 2022-05-04 DIAGNOSIS — C50.511 MALIGNANT NEOPLASM OF LOWER-OUTER QUADRANT OF RIGHT BREAST OF FEMALE, ESTROGEN RECEPTOR POSITIVE: Primary | ICD-10-CM

## 2022-05-04 DIAGNOSIS — Z15.09 MONOALLELIC MUTATION OF ATM GENE: ICD-10-CM

## 2022-05-04 DIAGNOSIS — Z15.01 MONOALLELIC MUTATION OF ATM GENE: ICD-10-CM

## 2022-05-04 DIAGNOSIS — Z79.811 AROMATASE INHIBITOR USE: ICD-10-CM

## 2022-05-04 DIAGNOSIS — Z17.0 MALIGNANT NEOPLASM OF LOWER-OUTER QUADRANT OF RIGHT BREAST OF FEMALE, ESTROGEN RECEPTOR POSITIVE: Primary | ICD-10-CM

## 2022-05-04 DIAGNOSIS — Z15.89 MONOALLELIC MUTATION OF ATM GENE: ICD-10-CM

## 2022-05-04 PROBLEM — M89.9 DISORDER OF BONE: Status: ACTIVE | Noted: 2022-05-04

## 2022-05-04 PROBLEM — R68.89 INCREASED HEAD CIRCUMFERENCE: Status: ACTIVE | Noted: 2022-05-04

## 2022-05-04 PROBLEM — E88.01 ALPHA-1-ANTITRYPSIN DEFICIENCY: Status: ACTIVE | Noted: 2022-05-04

## 2022-05-04 PROCEDURE — 3008F PR BODY MASS INDEX (BMI) DOCUMENTED: ICD-10-PCS | Mod: CPTII,S$GLB,, | Performed by: INTERNAL MEDICINE

## 2022-05-04 PROCEDURE — 99214 OFFICE O/P EST MOD 30 MIN: CPT | Mod: S$GLB,,, | Performed by: INTERNAL MEDICINE

## 2022-05-04 PROCEDURE — 1160F PR REVIEW ALL MEDS BY PRESCRIBER/CLIN PHARMACIST DOCUMENTED: ICD-10-PCS | Mod: CPTII,S$GLB,, | Performed by: INTERNAL MEDICINE

## 2022-05-04 PROCEDURE — 1159F MED LIST DOCD IN RCRD: CPT | Mod: CPTII,S$GLB,, | Performed by: INTERNAL MEDICINE

## 2022-05-04 PROCEDURE — 99999 PR PBB SHADOW E&M-EST. PATIENT-LVL III: ICD-10-PCS | Mod: PBBFAC,,, | Performed by: INTERNAL MEDICINE

## 2022-05-04 PROCEDURE — 4010F ACE/ARB THERAPY RXD/TAKEN: CPT | Mod: CPTII,S$GLB,, | Performed by: INTERNAL MEDICINE

## 2022-05-04 PROCEDURE — 99999 PR PBB SHADOW E&M-EST. PATIENT-LVL III: CPT | Mod: PBBFAC,,, | Performed by: INTERNAL MEDICINE

## 2022-05-04 PROCEDURE — 99214 PR OFFICE/OUTPT VISIT, EST, LEVL IV, 30-39 MIN: ICD-10-PCS | Mod: S$GLB,,, | Performed by: INTERNAL MEDICINE

## 2022-05-04 PROCEDURE — 3074F SYST BP LT 130 MM HG: CPT | Mod: CPTII,S$GLB,, | Performed by: INTERNAL MEDICINE

## 2022-05-04 PROCEDURE — 3008F BODY MASS INDEX DOCD: CPT | Mod: CPTII,S$GLB,, | Performed by: INTERNAL MEDICINE

## 2022-05-04 PROCEDURE — 3078F PR MOST RECENT DIASTOLIC BLOOD PRESSURE < 80 MM HG: ICD-10-PCS | Mod: CPTII,S$GLB,, | Performed by: INTERNAL MEDICINE

## 2022-05-04 PROCEDURE — 3074F PR MOST RECENT SYSTOLIC BLOOD PRESSURE < 130 MM HG: ICD-10-PCS | Mod: CPTII,S$GLB,, | Performed by: INTERNAL MEDICINE

## 2022-05-04 PROCEDURE — 3078F DIAST BP <80 MM HG: CPT | Mod: CPTII,S$GLB,, | Performed by: INTERNAL MEDICINE

## 2022-05-04 PROCEDURE — 1160F RVW MEDS BY RX/DR IN RCRD: CPT | Mod: CPTII,S$GLB,, | Performed by: INTERNAL MEDICINE

## 2022-05-04 PROCEDURE — 1159F PR MEDICATION LIST DOCUMENTED IN MEDICAL RECORD: ICD-10-PCS | Mod: CPTII,S$GLB,, | Performed by: INTERNAL MEDICINE

## 2022-05-04 PROCEDURE — 4010F PR ACE/ARB THEARPY RXD/TAKEN: ICD-10-PCS | Mod: CPTII,S$GLB,, | Performed by: INTERNAL MEDICINE

## 2022-05-04 RX ORDER — EXEMESTANE 25 MG/1
25 TABLET ORAL DAILY
Qty: 30 TABLET | Refills: 2 | Status: SHIPPED | OUTPATIENT
Start: 2022-05-23 | End: 2022-07-05 | Stop reason: ALTCHOICE

## 2022-05-04 RX ORDER — CLONAZEPAM 1 MG/1
1 TABLET ORAL 2 TIMES DAILY PRN
COMMUNITY
End: 2022-07-05

## 2022-05-04 RX ORDER — PIOGLITAZONEHYDROCHLORIDE 30 MG/1
30 TABLET ORAL DAILY
COMMUNITY

## 2022-05-04 NOTE — PROGRESS NOTES
Heme/Onc Progress Note    PATIENT: Jessie Greene  MRN: [id number]  DATE: 5/4/2022    Chief Complaint:  Current Treatment: Current Treatment: Arimidex (2/27/20) changed from Femara (1/2/20) stopped 2/13/20 due to intolerability     Oncology History   05/04/2022:  Patient is here today for follow-up.  The patient was last seen by my nurse practitioner on April 12. She had a nuclear medicine bone scan scheduled.  That nuclear medicine bone scan is negative for malignant disease.  She still complains of significant joint pain and stiffness.  While it is better than the Femara she still has difficulty with activities of daily living.  She did not stop her Arimidex as was instructed on the last note.  She got confused.  She was seen by GI she underwent underwent EGD.  Was noted to have some polyps biopsies of which are pending.     Diagnosis T2N1a, Stage IIb, Right lower outer quadrant infiltrating ductal carcinoma, diagnosed 10/23/19     Treatment History:  10/21/19: 2.8 cm mass in the right breast 8 o'clock position highly suggestive of malignancy with additional hypoechoic areas at 12:00, 2:00 9:00 and 10:00 suspicious for multicentric disease   highly suspicious of malignancy.   10/23/19: Ultrasound-guided biopsy of right breast 2 different sites: right breast biopsy 8 o'clock position 2 cm from the nipple invasive ductal carcinoma low-grade at least 1.6 cm with   microcalcifications with DCIS  ER 93.9% ND 90.8% Ki-67 23.7% HER-2/quang not expressed by FISH   11/6/19: CT C/A/P and bone scan-No conclusive evidence of osseous metastatic disease sclerotic lesion seen in the pelvis and the sacrum on comparison CT did not have clearly associated   uptake on the current bone scan but may be obscured by radiotracer uptake in the bladder. Degenerative uptake at L3. Uptake at the frontal bones represents hyperostosis frontalis.  11/18/19: Bilateral mastectomy sentinel lymph node biopsy   Pathology: Invasive ductal  carcinoma   2 foci: A. = 2.3 cm at the 8 o'clock position B.= 0.15 cm G1, ER/HI positive, HER2 negative  Clear margins, Multiple intraductal papillomas  [place] lymph node 1 out of 2 positive measuring 1.2 cm   Left breast intraductal papillomas bilateral left sentinel lymph nodes negative.   Additional axillary content of the right and left without evidence of metastatic disease.  Summary: T2 N1 aM0 = right breast 1 out of 5 lymph nodes involved, negative margins  ER 93.9%, HI 90.8% HER-2/quang negative  ONCO-DX score 11   5/18/20: EGD,  and abdominal ultrasound by Dr. Schneider for pancreatic cancer screening   9/22/2020: Outside pathology report from primary care's office show metastatic breast carcinoma metastasis to the thyroid.   Due to the above. Recommend the patient have a repeat CBC, CMP, tumor marker CA-15-3 and CA-27-29, and a PET/CT for thyroid metastasis from breast carcinoma to restage her.   10/2/2020 PET CT scan, periportal lymph node measures 12 mm in short axis with SUV 2.6, no other hypermetabolic lymph nodes identified. No suspicious osseous metabolism. Minimally enlarged periportal lymph node is mild hypermetabolism but the size is stable going back to 2019 this lymph node is indeterminate.   Partial right thyroidectomy, nodule hyperplasia some of the nodules show fibrosis and cystic generation calcification, one attached unremarkable parathyroid gland and 1 parathyroid lymph node negative for tumor. No evidence of malignancy. The review of the original tumor show fragments of parathyroid tissue r   Clinical History:   This patient at the age of 58 was noted to have a palpable breast mass in 2017. She had normal mammograms 2017 and skipped her MMG in 2016. She had noticed the lesion grow slowly and then presented with a palpable breast mass underwent imaging October 21, 2019. And subsequent biopsy. She was then seen and evaluated by surgery. She underwent a CT scan of the chest abdomen  pelvis and bone scan. And discussed breast conservation surgery versus mastectomy. She underwent bilateral mastectomies with bilateral sentinel lymph node biopsy. And was sent to oncology for further evaluation on December 4, 2019.           05/04/2022  Past Medical History:   Diagnosis Date    Diabetes mellitus     Diverticulitis     Ectopic pregnancy     Fatty liver     Kidney stones         Current Outpatient Medications:     albuterol (PROVENTIL) 5 mg/mL nebulizer solution, Take 2.5 mg by nebulization every 6 (six) hours as needed for Wheezing., Disp: , Rfl:     anastrozole (ARIMIDEX) 1 mg Tab, Take 1 mg by mouth., Disp: , Rfl:     buPROPion (WELLBUTRIN XL) 150 MG TB24 tablet, Take 150 mg by mouth once daily., Disp: , Rfl:     clonazePAM (KLONOPIN) 1 MG tablet, Take 1 mg by mouth 2 (two) times daily as needed for Anxiety., Disp: , Rfl:     empagliflozin-metformin (SYNJARDY XR) 25-1,000 mg TBph, Take by mouth., Disp: , Rfl:     FLUoxetine 40 MG capsule, Take 40 mg by mouth once daily., Disp: , Rfl:     fluticasone-umeclidin-vilanter (TRELEGY ELLIPTA) 100-62.5-25 mcg DsDv, Take by mouth., Disp: , Rfl:     losartan (COZAAR) 50 MG tablet, Take 50 mg by mouth once daily., Disp: , Rfl:     montelukast (SINGULAIR) 10 mg tablet, Take 10 mg by mouth every other day., Disp: , Rfl:     pioglitazone (ACTOS) 30 MG tablet, Take 30 mg by mouth once daily., Disp: , Rfl:     polyethylene glycol (GLYCOLAX) 17 gram/dose powder, Take 17 g by mouth., Disp: , Rfl:     rosuvastatin (CRESTOR) 10 MG tablet, Take 10 mg by mouth once daily., Disp: , Rfl:     SOLIQUA 100/33 100 unit-33 mcg/mL InPn pen, INJECT 60 UNITS SUBCUTANEOUSLY ONCE DAILY, Disp: , Rfl:     spironolactone (ALDACTONE) 50 MG tablet, Take 50 mg by mouth once daily., Disp: , Rfl:     cholecalciferol, vitamin D3, 1,250 mcg (50,000 unit) capsule, Take 50,000 Units by mouth every 7 days., Disp: , Rfl:     ketoconazole (NIZORAL) 2 % cream, Apply  topically., Disp: , Rfl:      Review of Systems    patient denies any fever chills night sweats or weight loss.  She still has significant increase in appetite.  She complains of neck pain which has been longstanding with pain into the left shoulder.  She has had that for years.  And stiffness.  She still complains of diffuse joint aching pain.  But no redness.  She has occasional tenderness in the chest wall.  She was seen by Dr. Sheth in the past.  She denies any cardiac type chest pain or palpitations.  No edema.  The rest of her 12 point review of systems unremarkable  Objective:     Vitals:    05/04/22 0902   BP: 107/75   Pulse: 64   Temp: 97.8 °F (36.6 °C)         Physical Exam    Normal Physical exam:  VITAL SIGNS:  Reviewed.  [  ]  GENERAL: [Well-developed well-nourished in no apparent cardiorespiratory distress]    HEAD: Normocephalic, atraumatic  EYES:  Pupils are equal.   reactive to light.  Extraocular motions intact.  No scleral icterus.  No pallor.  EARS:  Hearing grossly intact.  MOUTH:  Oropharynx is clear without exudates or erythema.   NECK: Supple no adenopathy, no JVD.  Trachea midline  CHEST:  Chest with clear breath sounds bilaterally.  No wheezes, rales, or rhonchi.    CARDIAC:  Regular rate and rhythm.  S1 and S2, without murmurs, gallops, or rubs.  Chest wall:  Status post bilateral mastectomy.  There is a mild seroma type cavity in the right upper area.  VASCULAR:  No Edema.  Peripheral pulses normal and equal in all extremities.  ABDOMEN:  Soft, without detectable tenderness.  No sign of distention.  No   rebound or guarding, and no masses palpated.   Bowel Sounds normal.  MUSCULOSKELETAL:  Good range of motion of all major joints. Extremities without clubbing, cyanosis or edema.    NEUROLOGIC EXAM:  Alert and oriented x 3.  No focal sensory or strength deficits.   Speech normal.  Follows commands.  PSYCHIATRIC:  Mood normal.  ECOG performance status is 0    Assessment:       Problem List  Items Addressed This Visit        Oncology    Malignant neoplasm of lower-outer quadrant of female breast - Primary    Aromatase inhibitor use       Genetic    Monoallelic mutation of CHIQUIS gene            Endocrine Therapy .  The side effects of anti-hormonal therapy were discussed.  Including but not limited to: Vasomotor hot flashes, dysuria, arthralgia, hyperlipidemia, osteopenia, osteoporosis. Hand outs given on medications and patient had the opportunity to ask questions.    Plan:       Discontinue Arimidex      If her symptoms resolve start Aromasin---  Follow-up primary care--if her symptoms do not resolve consider change in cholesterol medication  Return to clinic in 8 weeks with repeat CBC, CMP  If she is intolerant to Aromasin consider tamoxifen        Med Onc Chart Routing      Follow up with physician 2 months.   Follow up with [facility]    Labs CBC and CMP   Lab interval:     Imaging    Pharmacy appointment    Other referrals

## 2022-05-17 ENCOUNTER — APPOINTMENT (OUTPATIENT)
Dept: LAB | Facility: HOSPITAL | Age: 62
End: 2022-05-17
Attending: INTERNAL MEDICINE
Payer: COMMERCIAL

## 2022-05-17 DIAGNOSIS — K21.9 GASTROESOPHAGEAL REFLUX DISEASE, UNSPECIFIED WHETHER ESOPHAGITIS PRESENT: Primary | ICD-10-CM

## 2022-05-17 DIAGNOSIS — R89.8 ABNORMAL GENETIC TEST: ICD-10-CM

## 2022-05-17 DIAGNOSIS — Z80.0 FAMILY HISTORY OF MALIGNANT NEOPLASM OF GASTROINTESTINAL TRACT: ICD-10-CM

## 2022-05-17 LAB — CANCER AG19-9 SERPL-ACNC: 27.2 UNIT/ML (ref 0–37)

## 2022-05-17 PROCEDURE — 86301 IMMUNOASSAY TUMOR CA 19-9: CPT

## 2022-05-17 PROCEDURE — 36415 COLL VENOUS BLD VENIPUNCTURE: CPT

## 2022-07-05 ENCOUNTER — LAB VISIT (OUTPATIENT)
Dept: LAB | Facility: HOSPITAL | Age: 62
End: 2022-07-05
Attending: INTERNAL MEDICINE
Payer: COMMERCIAL

## 2022-07-05 ENCOUNTER — OFFICE VISIT (OUTPATIENT)
Dept: HEMATOLOGY/ONCOLOGY | Facility: CLINIC | Age: 62
End: 2022-07-05
Payer: COMMERCIAL

## 2022-07-05 VITALS
HEIGHT: 70 IN | WEIGHT: 222.25 LBS | DIASTOLIC BLOOD PRESSURE: 73 MMHG | TEMPERATURE: 98 F | BODY MASS INDEX: 31.82 KG/M2 | SYSTOLIC BLOOD PRESSURE: 113 MMHG | OXYGEN SATURATION: 98 % | HEART RATE: 63 BPM

## 2022-07-05 DIAGNOSIS — Z17.0 MALIGNANT NEOPLASM OF LOWER-OUTER QUADRANT OF RIGHT BREAST OF FEMALE, ESTROGEN RECEPTOR POSITIVE: Primary | ICD-10-CM

## 2022-07-05 DIAGNOSIS — Z17.0 MALIGNANT NEOPLASM OF LOWER-OUTER QUADRANT OF RIGHT BREAST OF FEMALE, ESTROGEN RECEPTOR POSITIVE: ICD-10-CM

## 2022-07-05 DIAGNOSIS — C50.511 MALIGNANT NEOPLASM OF LOWER-OUTER QUADRANT OF RIGHT BREAST OF FEMALE, ESTROGEN RECEPTOR POSITIVE: Primary | ICD-10-CM

## 2022-07-05 DIAGNOSIS — Z79.811 AROMATASE INHIBITOR USE: ICD-10-CM

## 2022-07-05 DIAGNOSIS — C50.511 MALIGNANT NEOPLASM OF LOWER-OUTER QUADRANT OF RIGHT BREAST OF FEMALE, ESTROGEN RECEPTOR POSITIVE: ICD-10-CM

## 2022-07-05 LAB
ALBUMIN SERPL-MCNC: 3.8 GM/DL (ref 3.4–4.8)
ALBUMIN/GLOB SERPL: 1.2 RATIO (ref 1.1–2)
ALP SERPL-CCNC: 110 UNIT/L (ref 40–150)
ALT SERPL-CCNC: 32 UNIT/L (ref 0–55)
AST SERPL-CCNC: 26 UNIT/L (ref 5–34)
BASOPHILS # BLD AUTO: 0.02 X10(3)/MCL (ref 0–0.2)
BASOPHILS NFR BLD AUTO: 0.4 %
BILIRUBIN DIRECT+TOT PNL SERPL-MCNC: 0.4 MG/DL
BUN SERPL-MCNC: 21.5 MG/DL (ref 9.8–20.1)
CALCIUM SERPL-MCNC: 9.9 MG/DL (ref 8.4–10.2)
CHLORIDE SERPL-SCNC: 104 MMOL/L (ref 98–107)
CO2 SERPL-SCNC: 27 MMOL/L (ref 23–31)
CREAT SERPL-MCNC: 0.97 MG/DL (ref 0.55–1.02)
EOSINOPHIL # BLD AUTO: 0.46 X10(3)/MCL (ref 0–0.9)
EOSINOPHIL NFR BLD AUTO: 9.1 %
ERYTHROCYTE [DISTWIDTH] IN BLOOD BY AUTOMATED COUNT: 14.5 % (ref 11.5–17)
GLOBULIN SER-MCNC: 3.1 GM/DL (ref 2.4–3.5)
GLUCOSE SERPL-MCNC: 89 MG/DL (ref 82–115)
HCT VFR BLD AUTO: 41.7 % (ref 37–47)
HGB BLD-MCNC: 13.4 GM/DL (ref 12–16)
IMM GRANULOCYTES # BLD AUTO: 0.01 X10(3)/MCL (ref 0–0.04)
IMM GRANULOCYTES NFR BLD AUTO: 0.2 %
LYMPHOCYTES # BLD AUTO: 2.55 X10(3)/MCL (ref 0.6–4.6)
LYMPHOCYTES NFR BLD AUTO: 50.3 %
MCH RBC QN AUTO: 30.1 PG (ref 27–31)
MCHC RBC AUTO-ENTMCNC: 32.1 MG/DL (ref 33–36)
MCV RBC AUTO: 93.7 FL (ref 80–94)
MONOCYTES # BLD AUTO: 0.33 X10(3)/MCL (ref 0.1–1.3)
MONOCYTES NFR BLD AUTO: 6.5 %
NEUTROPHILS # BLD AUTO: 1.7 X10(3)/MCL (ref 2.1–9.2)
NEUTROPHILS NFR BLD AUTO: 33.5 %
PLATELET # BLD AUTO: 316 X10(3)/MCL (ref 130–400)
PMV BLD AUTO: 8.1 FL (ref 7.4–10.4)
POTASSIUM SERPL-SCNC: 4.4 MMOL/L (ref 3.5–5.1)
PROT SERPL-MCNC: 6.9 GM/DL (ref 5.8–7.6)
RBC # BLD AUTO: 4.45 X10(6)/MCL (ref 4.2–5.4)
SODIUM SERPL-SCNC: 138 MMOL/L (ref 136–145)
WBC # SPEC AUTO: 5.1 X10(3)/MCL (ref 4.5–11.5)

## 2022-07-05 PROCEDURE — 3078F DIAST BP <80 MM HG: CPT | Mod: CPTII,S$GLB,, | Performed by: NURSE PRACTITIONER

## 2022-07-05 PROCEDURE — 4010F ACE/ARB THERAPY RXD/TAKEN: CPT | Mod: CPTII,S$GLB,, | Performed by: NURSE PRACTITIONER

## 2022-07-05 PROCEDURE — 1160F PR REVIEW ALL MEDS BY PRESCRIBER/CLIN PHARMACIST DOCUMENTED: ICD-10-PCS | Mod: CPTII,S$GLB,, | Performed by: NURSE PRACTITIONER

## 2022-07-05 PROCEDURE — 99999 PR PBB SHADOW E&M-EST. PATIENT-LVL IV: ICD-10-PCS | Mod: PBBFAC,,, | Performed by: NURSE PRACTITIONER

## 2022-07-05 PROCEDURE — 85025 COMPLETE CBC W/AUTO DIFF WBC: CPT

## 2022-07-05 PROCEDURE — 1159F PR MEDICATION LIST DOCUMENTED IN MEDICAL RECORD: ICD-10-PCS | Mod: CPTII,S$GLB,, | Performed by: NURSE PRACTITIONER

## 2022-07-05 PROCEDURE — 1160F RVW MEDS BY RX/DR IN RCRD: CPT | Mod: CPTII,S$GLB,, | Performed by: NURSE PRACTITIONER

## 2022-07-05 PROCEDURE — 3074F SYST BP LT 130 MM HG: CPT | Mod: CPTII,S$GLB,, | Performed by: NURSE PRACTITIONER

## 2022-07-05 PROCEDURE — 99213 PR OFFICE/OUTPT VISIT, EST, LEVL III, 20-29 MIN: ICD-10-PCS | Mod: S$GLB,,, | Performed by: NURSE PRACTITIONER

## 2022-07-05 PROCEDURE — 3008F PR BODY MASS INDEX (BMI) DOCUMENTED: ICD-10-PCS | Mod: CPTII,S$GLB,, | Performed by: NURSE PRACTITIONER

## 2022-07-05 PROCEDURE — 99213 OFFICE O/P EST LOW 20 MIN: CPT | Mod: S$GLB,,, | Performed by: NURSE PRACTITIONER

## 2022-07-05 PROCEDURE — 3078F PR MOST RECENT DIASTOLIC BLOOD PRESSURE < 80 MM HG: ICD-10-PCS | Mod: CPTII,S$GLB,, | Performed by: NURSE PRACTITIONER

## 2022-07-05 PROCEDURE — 1159F MED LIST DOCD IN RCRD: CPT | Mod: CPTII,S$GLB,, | Performed by: NURSE PRACTITIONER

## 2022-07-05 PROCEDURE — 3008F BODY MASS INDEX DOCD: CPT | Mod: CPTII,S$GLB,, | Performed by: NURSE PRACTITIONER

## 2022-07-05 PROCEDURE — 4010F PR ACE/ARB THEARPY RXD/TAKEN: ICD-10-PCS | Mod: CPTII,S$GLB,, | Performed by: NURSE PRACTITIONER

## 2022-07-05 PROCEDURE — 80053 COMPREHEN METABOLIC PANEL: CPT

## 2022-07-05 PROCEDURE — 36415 COLL VENOUS BLD VENIPUNCTURE: CPT

## 2022-07-05 PROCEDURE — 3074F PR MOST RECENT SYSTOLIC BLOOD PRESSURE < 130 MM HG: ICD-10-PCS | Mod: CPTII,S$GLB,, | Performed by: NURSE PRACTITIONER

## 2022-07-05 PROCEDURE — 99999 PR PBB SHADOW E&M-EST. PATIENT-LVL IV: CPT | Mod: PBBFAC,,, | Performed by: NURSE PRACTITIONER

## 2022-07-05 RX ORDER — TAMOXIFEN CITRATE 20 MG/1
20 TABLET ORAL DAILY
Qty: 30 TABLET | Refills: 11 | Status: SHIPPED | OUTPATIENT
Start: 2022-07-05 | End: 2022-11-08

## 2022-07-05 NOTE — PROGRESS NOTES
Heme/Onc Progress Note    PATIENT: Jessie Greene  MRN: [id number]  DATE: 7/5/2022  Chief Complaint: Follow-up (2 month f/u. States she has been having some muscle pain)    Oncology History Overview Note   05/04/2022::  Interim history:  The patient was last seen in the office on 04/12/2022.  Her aromatase inhibitor Arimidex was discontinued due to bone pain..  She underwent a nuclear medicine bone scan on 04/28/2022.  That nuclear medicine bone scan failed to reveal any significant suggestion of skeletal metastatic disease.  It was compared to her old CT from March of 2020. And this mild activity within the mid to lower thoracic spine is no significant change.  And her bone density is normal. She was thought to have mets at one time to thyroid, but that work up was not malignant in repeat evaluation and review    Diagnosis T2N1a, Stage IIb, Right lower outer quadrant infiltrating ductal carcinoma, diagnosed 10/23/19    Current Treatment: Changed to Tamoxifen 7/5/22  Arimidex (2/27/20)   Previous Femara (1/2/20) stopped 2/13/20 due to intolerability    Treatment History:  10/21/19: 2.8 cm mass in the right breast 8 o'clock position highly suggestive of malignancy with additional hypoechoic areas at 12:00, 2:00 9:00 and 10:00 suspicious for multicentric disease   highly suspicious of malignancy.  10/23/19: Ultrasound-guided biopsy of right breast 2 different sites: right breast biopsy 8 o'clock position 2 cm from the nipple invasive ductal carcinoma low-grade at least 1.6 cm with   microcalcifications with DCIS   ER 93.9% MT 90.8% Ki-67 23.7% HER-2/quang not expressed by FISH  11/6/19: CT C/A/P and bone scan-No conclusive evidence of osseous metastatic disease sclerotic lesion seen in the pelvis and the sacrum on comparison CT did not have clearly associated   uptake on the current bone scan but may be obscured by radiotracer uptake in the bladder. Degenerative uptake at L3. Uptake at the frontal bones  represents hyperostosis frontalis.  11/18/19: Bilateral mastectomy sentinel lymph node biopsy    Pathology: Invasive ductal carcinoma    2 foci: A. = 2.3 cm at the 8 o'clock position B.= 0.15 cm   G1, ER/MT positive, HER2 negative   Clear margins, Multiple intraductal papillomas   [place] lymph node 1 out of 2 positive measuring 1.2 cm    Left breast intraductal papillomas bilateral left sentinel lymph nodes negative.     Additional axillary content of the right and left without evidence of metastatic disease.   Summary: T2 N1 aM0 = right breast 1 out of 5 lymph nodes involved, negative margins   ER 93.9%, MT 90.8% HER-2/quang negative  ONCO-DX score 11  5/18/20: EGD,  and abdominal ultrasound by Dr. Schneider for pancreatic cancer screening    9/22/2020: Outside pathology report from primary care's office show metastatic breast carcinoma metastasis to the thyroid.  Due to the above.  Recommend the patient have a repeat CBC, CMP, tumor marker CA-15-3 and CA-27-29, and a PET/CT for thyroid metastasis from breast carcinoma to restage her.    10/2/2020 PET CT scan, periportal lymph node measures 12 mm in short axis with SUV 2.6, no other hypermetabolic lymph nodes identified.  No suspicious osseous metabolism.  Minimally enlarged periportal lymph node is mild hypermetabolism but the size is stable going back to 2019 this lymph node is indeterminate.    Partial right thyroidectomy, nodule hyperplasia some of the nodules show fibrosis and cystic generation calcification, one attached unremarkable parathyroid gland and 1 parathyroid lymph node negative for tumor.  No evidence of malignancy.  The review of the original tumor  show fragments of parathyroid tissue r       Malignant neoplasm of lower-outer quadrant of female breast   11/18/2019 Cancer Staged    Staging form: Breast, AJCC 8th Edition  - Pathologic stage from 11/18/2019: Stage IA (pT2, pN1a(sn), cM0, G1, ER+, MT+, HER2-, Oncotype DX score: 11)        "  07/05/2022  Patient presents for follow up. She complains of some left sided neck pain. Painful ROM. She has had "for years"   Stiffness in the morning, worse when sitting for long periods. When she holds AI, bone pain and aches improve.       Past Medical History:   Diagnosis Date    Diabetes mellitus     Diverticulitis     Ectopic pregnancy     Fatty liver     Kidney stones         Current Outpatient Medications:     empagliflozin-metformin (SYNJARDY XR) 25-1,000 mg TBph, Take by mouth., Disp: , Rfl:     FLUoxetine 40 MG capsule, Take 40 mg by mouth once daily., Disp: , Rfl:     fluticasone-umeclidin-vilanter (TRELEGY ELLIPTA) 100-62.5-25 mcg DsDv, Take by mouth., Disp: , Rfl:     ketoconazole (NIZORAL) 2 % cream, Apply topically., Disp: , Rfl:     losartan (COZAAR) 50 MG tablet, Take 50 mg by mouth once daily., Disp: , Rfl:     montelukast (SINGULAIR) 10 mg tablet, Take 10 mg by mouth every other day., Disp: , Rfl:     pioglitazone (ACTOS) 30 MG tablet, Take 30 mg by mouth once daily., Disp: , Rfl:     rosuvastatin (CRESTOR) 10 MG tablet, Take 10 mg by mouth once daily., Disp: , Rfl:     SOLIQUA 100/33 100 unit-33 mcg/mL InPn pen, INJECT 60 UNITS SUBCUTANEOUSLY ONCE DAILY, Disp: , Rfl:     spironolactone (ALDACTONE) 50 MG tablet, Take 50 mg by mouth once daily., Disp: , Rfl:     albuterol (PROVENTIL) 5 mg/mL nebulizer solution, Take 2.5 mg by nebulization every 6 (six) hours as needed for Wheezing., Disp: , Rfl:      Review of Systems:   Pertinent positives and negatives included in the HPI. Otherwise a complete review of systems is negative.      Objective:     Vitals:    07/05/22 0919   BP: 113/73   Pulse: 63   Temp: 97.8 °F (36.6 °C)         Physical Exam  Constitutional:       Appearance: Normal appearance.   HENT:      Head: Normocephalic and atraumatic.      Nose: Nose normal.      Mouth/Throat:      Mouth: Mucous membranes are moist.   Eyes:      Extraocular Movements: Extraocular " movements intact.      Conjunctiva/sclera: Conjunctivae normal.      Pupils: Pupils are equal, round, and reactive to light.   Cardiovascular:      Rate and Rhythm: Normal rate and regular rhythm.      Pulses: Normal pulses.   Pulmonary:      Effort: Pulmonary effort is normal.      Breath sounds: Normal breath sounds.   Abdominal:      General: Bowel sounds are normal.      Palpations: Abdomen is soft.   Musculoskeletal:      Cervical back: Normal range of motion and neck supple.   Neurological:      General: No focal deficit present.      Mental Status: She is alert and oriented to person, place, and time. Mental status is at baseline.   Psychiatric:         Mood and Affect: Mood normal.         Behavior: Behavior normal.            Assessment and Plan            Oncology     Malignant neoplasm of lower-outer quadrant of female breast - Primary     Aromatase inhibitor use          Genetic     Monoallelic mutation of CHIQUIS gene     Intolerance to Femara, Arimidex, and now Aromasin   Follow-up primary care--consider change in cholesterol medication  Change to tamoxifen       Follow up in about 4 weeks (around 8/2/2022) for Telephone Vist with [facility].        This case was discussed and plan formulated with Maria Isabel Chacon NP

## 2022-07-14 ENCOUNTER — PATIENT MESSAGE (OUTPATIENT)
Dept: HEMATOLOGY/ONCOLOGY | Facility: CLINIC | Age: 62
End: 2022-07-14
Payer: COMMERCIAL

## 2022-07-14 DIAGNOSIS — Z85.9 NECK PAIN WITH HISTORY OF MALIGNANT NEOPLASM: Primary | ICD-10-CM

## 2022-07-14 DIAGNOSIS — M54.2 NECK PAIN WITH HISTORY OF MALIGNANT NEOPLASM: Primary | ICD-10-CM

## 2022-07-25 ENCOUNTER — LAB VISIT (OUTPATIENT)
Dept: LAB | Facility: HOSPITAL | Age: 62
End: 2022-07-25
Attending: NURSE PRACTITIONER
Payer: COMMERCIAL

## 2022-07-25 DIAGNOSIS — Z85.9 NECK PAIN WITH HISTORY OF MALIGNANT NEOPLASM: ICD-10-CM

## 2022-07-25 DIAGNOSIS — M54.2 NECK PAIN WITH HISTORY OF MALIGNANT NEOPLASM: ICD-10-CM

## 2022-07-25 LAB — CREAT SERPL-MCNC: 0.86 MG/DL (ref 0.55–1.02)

## 2022-07-25 PROCEDURE — 82565 ASSAY OF CREATININE: CPT

## 2022-07-25 PROCEDURE — 36415 COLL VENOUS BLD VENIPUNCTURE: CPT

## 2022-08-02 ENCOUNTER — OFFICE VISIT (OUTPATIENT)
Dept: HEMATOLOGY/ONCOLOGY | Facility: CLINIC | Age: 62
End: 2022-08-02
Payer: COMMERCIAL

## 2022-08-02 DIAGNOSIS — C50.511 MALIGNANT NEOPLASM OF LOWER-OUTER QUADRANT OF RIGHT BREAST OF FEMALE, ESTROGEN RECEPTOR POSITIVE: Primary | ICD-10-CM

## 2022-08-02 DIAGNOSIS — Z17.0 MALIGNANT NEOPLASM OF LOWER-OUTER QUADRANT OF RIGHT BREAST OF FEMALE, ESTROGEN RECEPTOR POSITIVE: Primary | ICD-10-CM

## 2022-08-02 DIAGNOSIS — M50.90 CERVICAL DISC DISEASE: Primary | ICD-10-CM

## 2022-08-02 PROBLEM — Z79.811 AROMATASE INHIBITOR USE: Status: RESOLVED | Noted: 2022-05-03 | Resolved: 2022-08-02

## 2022-08-02 PROCEDURE — 1159F MED LIST DOCD IN RCRD: CPT | Mod: CPTII,95,, | Performed by: NURSE PRACTITIONER

## 2022-08-02 PROCEDURE — 99212 PR OFFICE/OUTPT VISIT, EST, LEVL II, 10-19 MIN: ICD-10-PCS | Mod: 95,,, | Performed by: NURSE PRACTITIONER

## 2022-08-02 PROCEDURE — 99212 OFFICE O/P EST SF 10 MIN: CPT | Mod: 95,,, | Performed by: NURSE PRACTITIONER

## 2022-08-02 PROCEDURE — 4010F ACE/ARB THERAPY RXD/TAKEN: CPT | Mod: CPTII,95,, | Performed by: NURSE PRACTITIONER

## 2022-08-02 PROCEDURE — 4010F PR ACE/ARB THEARPY RXD/TAKEN: ICD-10-PCS | Mod: CPTII,95,, | Performed by: NURSE PRACTITIONER

## 2022-08-02 PROCEDURE — 1159F PR MEDICATION LIST DOCUMENTED IN MEDICAL RECORD: ICD-10-PCS | Mod: CPTII,95,, | Performed by: NURSE PRACTITIONER

## 2022-08-02 NOTE — PROGRESS NOTES
Heme/Onc Progress Note    PATIENT: Jessie Greene  MRN: [id number]  DATE: 8/2/2022  Chief Complaint: Follow-up (Telephone visit. Scan result)    Oncology History Overview Note   05/04/2022::  Interim history:  The patient was last seen in the office on 04/12/2022.  Her aromatase inhibitor Arimidex was discontinued due to bone pain..  She underwent a nuclear medicine bone scan on 04/28/2022.  That nuclear medicine bone scan failed to reveal any significant suggestion of skeletal metastatic disease.  It was compared to her old CT from March of 2020. And this mild activity within the mid to lower thoracic spine is no significant change.  And her bone density is normal. She was thought to have mets at one time to thyroid, but that work up was not malignant in repeat evaluation and review    Diagnosis T2N1a, Stage IIb, Right lower outer quadrant infiltrating ductal carcinoma, diagnosed 10/23/19    Current Treatment: Changed to Tamoxifen 7/5/22  Arimidex (2/27/20)   Previous Femara (1/2/20) stopped 2/13/20 due to intolerability    Treatment History:  10/21/19: 2.8 cm mass in the right breast 8 o'clock position highly suggestive of malignancy with additional hypoechoic areas at 12:00, 2:00 9:00 and 10:00 suspicious for multicentric disease   highly suspicious of malignancy.  10/23/19: Ultrasound-guided biopsy of right breast 2 different sites: right breast biopsy 8 o'clock position 2 cm from the nipple invasive ductal carcinoma low-grade at least 1.6 cm with   microcalcifications with DCIS   ER 93.9% [place] 90.8% Ki-67 23.7% HER-2/quang not expressed by FISH  11/6/19: CT C/A/P and bone scan-No conclusive evidence of osseous metastatic disease sclerotic lesion seen in the pelvis and the sacrum on comparison CT did not have clearly associated   uptake on the current bone scan but may be obscured by radiotracer uptake in the bladder. Degenerative uptake at L3. Uptake at the frontal bones represents hyperostosis  olive.  11/18/19: Bilateral mastectomy sentinel lymph node biopsy    Pathology: Invasive ductal carcinoma    2 foci: A. = 2.3 cm at the 8 o'clock position B.= 0.15 cm   G1, ER/ID positive, HER2 negative   Clear margins, Multiple intraductal papillomas   [place] lymph node 1 out of 2 positive measuring 1.2 cm    Left breast intraductal papillomas bilateral left sentinel lymph nodes negative.     Additional axillary content of the right and left without evidence of metastatic disease.   Summary: T2 N1 aM0 = right breast 1 out of 5 lymph nodes involved, negative margins   ER 93.9%, [place] 90.8% HER-2/quang negative  ONCO-DX score 11  5/18/20: EGD,  and abdominal ultrasound by Dr. Schneider for pancreatic cancer screening    9/22/2020: Outside pathology report from primary care's office show metastatic breast carcinoma metastasis to the thyroid.  Due to the above.  Recommend the patient have a repeat CBC, CMP, tumor marker CA-15-3 and CA-27-29, and a PET/CT for thyroid metastasis from breast carcinoma to restage her.    10/2/2020 PET CT scan, periportal lymph node measures 12 mm in short axis with SUV 2.6, no other hypermetabolic lymph nodes identified.  No suspicious osseous metabolism.  Minimally enlarged periportal lymph node is mild hypermetabolism but the size is stable going back to 2019 this lymph node is indeterminate.    Partial right thyroidectomy, nodule hyperplasia some of the nodules show fibrosis and cystic generation calcification, one attached unremarkable parathyroid gland and 1 parathyroid lymph node negative for tumor.  No evidence of malignancy.  The review of the original tumor  show fragments of parathyroid tissue r       Malignant neoplasm of lower-outer quadrant of female breast   11/18/2019 Cancer Staged    Staging form: Breast, AJCC 8th Edition  - Pathologic stage from 11/18/2019: Stage IA (pT2, pN1a(sn), cM0, G1, ER+, ID+, HER2-, Oncotype DX score: 11)         08/02/2022  Audio Only  Telehealth Visit     The patient location is: home  Visit type: Virtual visit with audio only (telephone)  Total time spent with patient: 15 minutes     The reason for the audio only service rather than synchronous audio and video virtual visit was related to technical difficulties or patient preference/necessity.     Each patient to whom I provide medical services by telemedicine is:  (1) informed of the relationship between the physician and patient and the respective role of any other health care provider with respect to management of the patient; and (2) notified that they may decline to receive medical services by telemedicine and may withdraw from such care at any time. Patient verbally consented to receive this service via voice-only telephone call.                    Past Medical History:   Diagnosis Date    Diabetes mellitus     Diverticulitis     Ectopic pregnancy     Fatty liver     Kidney stones         Current Outpatient Medications:     albuterol (PROVENTIL) 5 mg/mL nebulizer solution, Take 2.5 mg by nebulization every 6 (six) hours as needed for Wheezing., Disp: , Rfl:     empagliflozin-metformin (SYNJARDY XR) 25-1,000 mg TBph, Take by mouth., Disp: , Rfl:     FLUoxetine 40 MG capsule, Take 40 mg by mouth once daily., Disp: , Rfl:     fluticasone-umeclidin-vilanter (TRELEGY ELLIPTA) 100-62.5-25 mcg DsDv, Take by mouth., Disp: , Rfl:     ketoconazole (NIZORAL) 2 % cream, Apply topically., Disp: , Rfl:     losartan (COZAAR) 50 MG tablet, Take 50 mg by mouth once daily., Disp: , Rfl:     montelukast (SINGULAIR) 10 mg tablet, Take 10 mg by mouth every other day., Disp: , Rfl:     pioglitazone (ACTOS) 30 MG tablet, Take 30 mg by mouth once daily., Disp: , Rfl:     rosuvastatin (CRESTOR) 10 MG tablet, Take 10 mg by mouth once daily., Disp: , Rfl:     SOLIQUA 100/33 100 unit-33 mcg/mL InPn pen, INJECT 60 UNITS SUBCUTANEOUSLY ONCE DAILY, Disp: , Rfl:     spironolactone (ALDACTONE) 50 MG  "tablet, Take 50 mg by mouth once daily., Disp: , Rfl:     tamoxifen (NOLVADEX) 20 MG Tab, Take 1 tablet (20 mg total) by mouth once daily., Disp: 30 tablet, Rfl: 11     Review of Systems:   Pertinent positives and negatives included in the HPI. Otherwise a complete review of systems is negative.      Objective:     There were no vitals filed for this visit.      Physical Exam       Assessment and Plan            Oncology     Malignant neoplasm of lower-outer quadrant of female breast - Primary               Genetic     Monoallelic mutation of CHIQUIS gene     Intolerance to Femara, Arimidex, and now Aromasin   Follow-up primary care--consider change in cholesterol medication   Continue tamoxifen   Discussed MRI results. Patient does not want to see neurosurgeon or PT due to bad experience with her . She just want to "make sure it wasn't her cancer"      Follow up in about 3 months (around 11/2/2022) for Labs and visit with Dr. Castañeda.      Answers for HPI/ROS submitted by the patient on 7/30/2022  appetite change : Yes  unexpected weight change: Yes  mouth sores: No  visual disturbance: No  cough: No  shortness of breath: No  chest pain: No  abdominal pain: No  diarrhea: No  frequency: No  back pain: No  rash: No  headaches: No  adenopathy: No  nervous/ anxious: No      "

## 2022-08-27 ENCOUNTER — HOSPITAL ENCOUNTER (EMERGENCY)
Facility: HOSPITAL | Age: 62
Discharge: HOME | End: 2022-08-27
Attending: EMERGENCY MEDICINE
Payer: COMMERCIAL

## 2022-08-27 VITALS
RESPIRATION RATE: 16 BRPM | BODY MASS INDEX: 33.19 KG/M2 | TEMPERATURE: 98 F | HEART RATE: 67 BPM | DIASTOLIC BLOOD PRESSURE: 64 MMHG | OXYGEN SATURATION: 97 % | SYSTOLIC BLOOD PRESSURE: 124 MMHG | WEIGHT: 228 LBS

## 2022-08-27 DIAGNOSIS — J01.00 ACUTE NON-RECURRENT MAXILLARY SINUSITIS: ICD-10-CM

## 2022-08-27 DIAGNOSIS — H81.13 BENIGN PAROXYSMAL POSITIONAL VERTIGO DUE TO BILATERAL VESTIBULAR DISORDER: Primary | ICD-10-CM

## 2022-08-27 DIAGNOSIS — R42 DIZZINESS: ICD-10-CM

## 2022-08-27 DIAGNOSIS — E86.0 DEHYDRATION: ICD-10-CM

## 2022-08-27 LAB
ALBUMIN SERPL-MCNC: 3.8 GM/DL (ref 3.4–4.8)
ALBUMIN/GLOB SERPL: 1.2 RATIO (ref 1.1–2)
ALP SERPL-CCNC: 100 UNIT/L (ref 40–150)
ALT SERPL-CCNC: 25 UNIT/L (ref 0–55)
APPEARANCE UR: CLEAR
APTT PPP: 29.8 SECONDS (ref 23.2–33.7)
AST SERPL-CCNC: 24 UNIT/L (ref 5–34)
BACTERIA #/AREA URNS AUTO: ABNORMAL /HPF
BASOPHILS # BLD AUTO: 0.06 X10(3)/MCL (ref 0–0.2)
BASOPHILS NFR BLD AUTO: 1.1 %
BILIRUB UR QL STRIP.AUTO: NEGATIVE MG/DL
BILIRUBIN DIRECT+TOT PNL SERPL-MCNC: 0.5 MG/DL
BUN SERPL-MCNC: 22.8 MG/DL (ref 9.8–20.1)
CALCIUM SERPL-MCNC: 9.6 MG/DL (ref 8.4–10.2)
CHLORIDE SERPL-SCNC: 108 MMOL/L (ref 98–107)
CO2 SERPL-SCNC: 23 MMOL/L (ref 23–31)
COLOR UR AUTO: YELLOW
CREAT SERPL-MCNC: 0.85 MG/DL (ref 0.55–1.02)
EOSINOPHIL # BLD AUTO: 0.34 X10(3)/MCL (ref 0–0.9)
EOSINOPHIL NFR BLD AUTO: 6 %
ERYTHROCYTE [DISTWIDTH] IN BLOOD BY AUTOMATED COUNT: 15.9 % (ref 11.5–17)
GFR SERPLBLD CREATININE-BSD FMLA CKD-EPI: >60 MLS/MIN/1.73/M2
GLOBULIN SER-MCNC: 3.1 GM/DL (ref 2.4–3.5)
GLUCOSE SERPL-MCNC: 125 MG/DL (ref 82–115)
GLUCOSE UR QL STRIP.AUTO: ABNORMAL MG/DL
HCT VFR BLD AUTO: 41.9 % (ref 37–47)
HGB BLD-MCNC: 13.4 GM/DL (ref 12–16)
IMM GRANULOCYTES # BLD AUTO: 0.02 X10(3)/MCL (ref 0–0.04)
IMM GRANULOCYTES NFR BLD AUTO: 0.4 %
INR BLD: 1.03 (ref 0–1.3)
KETONES UR QL STRIP.AUTO: ABNORMAL MG/DL
LEUKOCYTE ESTERASE UR QL STRIP.AUTO: ABNORMAL UNIT/L
LYMPHOCYTES # BLD AUTO: 2.37 X10(3)/MCL (ref 0.6–4.6)
LYMPHOCYTES NFR BLD AUTO: 42.1 %
MCH RBC QN AUTO: 30.7 PG (ref 27–31)
MCHC RBC AUTO-ENTMCNC: 32 MG/DL (ref 33–36)
MCV RBC AUTO: 95.9 FL (ref 80–94)
MONOCYTES # BLD AUTO: 0.35 X10(3)/MCL (ref 0.1–1.3)
MONOCYTES NFR BLD AUTO: 6.2 %
NEUTROPHILS # BLD AUTO: 2.5 X10(3)/MCL (ref 2.1–9.2)
NEUTROPHILS NFR BLD AUTO: 44.2 %
NITRITE UR QL STRIP.AUTO: NEGATIVE
NRBC BLD AUTO-RTO: 0 %
PH UR STRIP.AUTO: 5.5 [PH]
PLATELET # BLD AUTO: 276 X10(3)/MCL (ref 130–400)
PMV BLD AUTO: 9 FL (ref 7.4–10.4)
POTASSIUM SERPL-SCNC: 4.3 MMOL/L (ref 3.5–5.1)
PROT SERPL-MCNC: 6.9 GM/DL (ref 5.8–7.6)
PROT UR QL STRIP.AUTO: NEGATIVE MG/DL
PROTHROMBIN TIME: 13.4 SECONDS (ref 12.5–14.5)
RBC # BLD AUTO: 4.37 X10(6)/MCL (ref 4.2–5.4)
RBC #/AREA URNS AUTO: <5 /HPF
RBC UR QL AUTO: NEGATIVE UNIT/L
SODIUM SERPL-SCNC: 138 MMOL/L (ref 136–145)
SP GR UR STRIP.AUTO: 1.03 (ref 1–1.03)
SQUAMOUS #/AREA URNS AUTO: <5 /HPF
TROPONIN I SERPL-MCNC: <0.01 NG/ML (ref 0–0.04)
TSH SERPL-ACNC: 2.63 UIU/ML (ref 0.35–4.94)
UROBILINOGEN UR STRIP-ACNC: 1 MG/DL
WBC # SPEC AUTO: 5.6 X10(3)/MCL (ref 4.5–11.5)
WBC #/AREA URNS AUTO: 10 /HPF

## 2022-08-27 PROCEDURE — 85610 PROTHROMBIN TIME: CPT | Performed by: PHYSICIAN ASSISTANT

## 2022-08-27 PROCEDURE — 84443 ASSAY THYROID STIM HORMONE: CPT | Performed by: PHYSICIAN ASSISTANT

## 2022-08-27 PROCEDURE — 25000003 PHARM REV CODE 250: Performed by: EMERGENCY MEDICINE

## 2022-08-27 PROCEDURE — 84484 ASSAY OF TROPONIN QUANT: CPT | Performed by: EMERGENCY MEDICINE

## 2022-08-27 PROCEDURE — 81001 URINALYSIS AUTO W/SCOPE: CPT | Performed by: PHYSICIAN ASSISTANT

## 2022-08-27 PROCEDURE — 99285 EMERGENCY DEPT VISIT HI MDM: CPT | Mod: 25

## 2022-08-27 PROCEDURE — 93005 ELECTROCARDIOGRAM TRACING: CPT

## 2022-08-27 PROCEDURE — 36415 COLL VENOUS BLD VENIPUNCTURE: CPT | Performed by: PHYSICIAN ASSISTANT

## 2022-08-27 PROCEDURE — 85730 THROMBOPLASTIN TIME PARTIAL: CPT | Performed by: PHYSICIAN ASSISTANT

## 2022-08-27 PROCEDURE — 63600175 PHARM REV CODE 636 W HCPCS: Performed by: EMERGENCY MEDICINE

## 2022-08-27 PROCEDURE — 85025 COMPLETE CBC W/AUTO DIFF WBC: CPT | Performed by: PHYSICIAN ASSISTANT

## 2022-08-27 PROCEDURE — 80053 COMPREHEN METABOLIC PANEL: CPT | Performed by: PHYSICIAN ASSISTANT

## 2022-08-27 RX ORDER — DOXYCYCLINE HYCLATE 100 MG
100 TABLET ORAL EVERY 12 HOURS
Qty: 14 TABLET | Refills: 0 | Status: SHIPPED | OUTPATIENT
Start: 2022-08-27 | End: 2022-09-03

## 2022-08-27 RX ORDER — MECLIZINE HYDROCHLORIDE 25 MG/1
25 TABLET ORAL 3 TIMES DAILY PRN
Qty: 20 TABLET | Refills: 0 | Status: SHIPPED | OUTPATIENT
Start: 2022-08-27 | End: 2023-11-08

## 2022-08-27 RX ORDER — MECLIZINE HYDROCHLORIDE 25 MG/1
50 TABLET ORAL
Status: COMPLETED | OUTPATIENT
Start: 2022-08-27 | End: 2022-08-27

## 2022-08-27 RX ADMIN — MECLIZINE HYDROCHLORIDE 50 MG: 25 TABLET ORAL at 01:08

## 2022-08-27 RX ADMIN — SODIUM CHLORIDE, POTASSIUM CHLORIDE, SODIUM LACTATE AND CALCIUM CHLORIDE 1000 ML: 600; 310; 30; 20 INJECTION, SOLUTION INTRAVENOUS at 02:08

## 2022-08-27 NOTE — ED PROVIDER NOTES
Encounter Date: 8/27/2022       History     Chief Complaint   Patient presents with    Dizziness     Pt presents c/o dizziness with right ear hearing loss.  Onset months.  Seeing oncologist/ r/o acoustic neuroma. Denies cp/sob.     61-year-old female with history of hypertension, breast cancer on tamoxifen presents the ED with months of dizziness that has worsened more recently.  The dizziness been ongoing for quite some time however she feels like it is worsening and was resulted in some falls.  She denies lightheadedness but endorses room spinning and denies any vision changes.  She also had sudden hearing loss for which she has been seeing ENT and received a steroid injection.  She had an outpatient MRI ordered to evaluate for acoustic neuroma.  She felt like she could not wait therefore she presented to the ED.  She has had no other imaging to evaluate this.  She also had a 1 minute episode of left-sided sharp chest pain without shortness of breath, diaphoresis or nausea that resolved spontaneously.  No headaches focal neuro deficits per patient    The history is provided by the patient. No  was used.   Dizziness  This is a chronic problem. The current episode started more than 1 week ago. The problem has been gradually worsening. Associated symptoms include chest pain. Pertinent negatives include no abdominal pain and no shortness of breath. Exacerbated by: sudden movementsw. The symptoms are relieved by rest. She has tried nothing for the symptoms. The treatment provided no relief.   Review of patient's allergies indicates:   Allergen Reactions    Penicillins Hives    Dilaudid [hydromorphone]     Percocet [oxycodone-acetaminophen]      Past Medical History:   Diagnosis Date    Diabetes mellitus     Diverticulitis     Ectopic pregnancy     Fatty liver     Kidney stones      Past Surgical History:   Procedure Laterality Date    BIOPSY OF THYROID      COLONOSCOPY      DILATION AND CURETTAGE OF  UTERUS      HYSTERECTOMY      LIVER BIOPSY N/A     Mammogram      MASTECTOMY      THYROIDECTOMY       Family History   Problem Relation Age of Onset    Cancer Mother     Hypertension Father     Diabetes Father      Social History     Tobacco Use    Smoking status: Never    Smokeless tobacco: Never   Substance Use Topics    Alcohol use: No     Review of Systems   Constitutional:  Negative for activity change, diaphoresis, fatigue and fever.   HENT:  Negative for congestion, postnasal drip, rhinorrhea, sinus pain, sneezing and sore throat.    Respiratory:  Negative for cough, chest tightness, shortness of breath and wheezing.    Cardiovascular:  Positive for chest pain. Negative for palpitations and leg swelling.   Gastrointestinal:  Negative for abdominal distention, abdominal pain and blood in stool.   Genitourinary:  Negative for decreased urine volume, difficulty urinating and dysuria.   Musculoskeletal: Negative.    Skin:  Negative for color change and pallor.   Neurological:  Positive for dizziness. Negative for speech difficulty, weakness, light-headedness and numbness.   All other systems reviewed and are negative.    Physical Exam     Initial Vitals [08/27/22 1224]   BP Pulse Resp Temp SpO2   129/76 71 18 98.1 °F (36.7 °C) 96 %      MAP       --         Physical Exam    Nursing note and vitals reviewed.  Constitutional: She appears well-developed and well-nourished. She is not diaphoretic. No distress.   HENT:   Head: Normocephalic and atraumatic.   Nose: Nose normal.   Mouth/Throat: Oropharynx is clear and moist.   Eyes: Conjunctivae and EOM are normal. Pupils are equal, round, and reactive to light.   Neck: Trachea normal. Neck supple.   Normal range of motion.  Cardiovascular:  Normal rate, regular rhythm, normal heart sounds and intact distal pulses.           No murmur heard.  Pulmonary/Chest: Breath sounds normal. No respiratory distress. She has no wheezes. She has no rhonchi. She has no rales. She  exhibits no tenderness.   Abdominal: Abdomen is soft. Bowel sounds are normal. She exhibits no distension and no mass. There is no abdominal tenderness. There is no rebound and no guarding.   Musculoskeletal:         General: No tenderness or edema. Normal range of motion.      Cervical back: Normal range of motion and neck supple.      Lumbar back: Normal. Normal range of motion.     Neurological: She is alert and oriented to person, place, and time. She has normal strength. No cranial nerve deficit or sensory deficit.   Normal finger to nose and heel  to shin   Skin: Skin is warm and dry. Capillary refill takes less than 2 seconds. No abscess noted. No erythema. No pallor.   Psychiatric: She has a normal mood and affect. Her behavior is normal. Judgment and thought content normal.       ED Course   Procedures  Labs Reviewed   COMPREHENSIVE METABOLIC PANEL - Abnormal; Notable for the following components:       Result Value    Chloride 108 (*)     Glucose Level 125 (*)     Blood Urea Nitrogen 22.8 (*)     All other components within normal limits   URINALYSIS, REFLEX TO URINE CULTURE - Abnormal; Notable for the following components:    Glucose, UA 3+ (*)     Ketones, UA Trace (*)     Leukocyte Esterase, UA 1+ (*)     All other components within normal limits   CBC WITH DIFFERENTIAL - Abnormal; Notable for the following components:    MCV 95.9 (*)     MCHC 32.0 (*)     All other components within normal limits   URINALYSIS, MICROSCOPIC - Abnormal; Notable for the following components:    WBC, UA 10 (*)     All other components within normal limits   TSH - Normal   PROTIME-INR - Normal   APTT - Normal   TROPONIN I - Normal   CBC W/ AUTO DIFFERENTIAL    Narrative:     The following orders were created for panel order CBC auto differential.  Procedure                               Abnormality         Status                     ---------                               -----------         ------                     CBC  with Differential[[id number]]        Abnormal            Final result                 Please view results for these tests on the individual orders.     EKG Readings: (Independently Interpreted)   Initial Reading: No STEMI. Rhythm: Normal Sinus Rhythm. Heart Rate: 66. Ectopy: No Ectopy. Conduction: Normal. ST Segments: Normal ST Segments. T Waves: Normal. Axis: Normal.   1233     Imaging Results              CT Head Without Contrast (Final result)  Result time 08/27/22 14:02:43      Final result by Kevon Perry MD (08/27/22 14:02:43)                   Impression:      1. No acute intracranial abnormality.  2. Age-related atrophy and chronic sequela of white matter microvascular disease.  3. Findings suggestive of right acute maxillary sinusitis.  4. Additional chronic secondary details discussed above.      Electronically signed by: Kevon Perry  Date:    08/27/2022  Time:    14:02               Narrative:    EXAMINATION:  CT HEAD WITHOUT CONTRAST    CLINICAL HISTORY:  ; Dizziness, persistent/recurrent, cardiac or vascular cause suspected;    TECHNIQUE:  Axial CT images of the head were acquired without the intravenous administration of iodine-based contrast media.  Multiplanar reconstructions were accomplished by a CT technologist at a separate workstation and pushed to PACS for physician review.    Automated tube current modulation, weight-based exposure dosing, and/or iterative reconstruction technique utilized to reach lowest reasonably achievable exposure rate.  DLP: 1053 mGy*cm    COMPARISON:  None available at the time of the initial interpretation.    FINDINGS:  Images were reviewed in subdural, brain, soft tissue, and bone windows.    Exam quality: Motion/streak artifact limits assessment of the posterior fossa.    Hemorrhage:No evidence of acute hyperattenuating blood products.    Parenchyma: There is diffuse white matter hypoattenuation, typical of chronic microvascular changes. No discrete mass, mass  effect, or CT evidence of acute large vascular territory insult. Gray-white differentiation is preserved.    Midline shift: None.    CSF spaces: Proportional appearance of ventricular and sulcal enlargement. No hydrocephalus. No masses or fluid collections.    Vasculature: No focally hyperdense artery. Scattered carotid siphon calcifications are present. No abnormal densities within the dural sinuses.    Other findings: No abnormalities of the scalp or subjacent osseous structures. Mastoids are well aerated. No focal abnormality of the sella. No acute displaced maxillofacial fracture is identified.  Fluid level is incidentally appreciated within the right maxillary sinus, with mild bilateral maxillary sinus mucosal thickening.                                       X-Ray Chest AP Portable (Final result)  Result time 08/27/22 13:17:22      Final result by Anshu Arzate MD (08/27/22 13:17:22)                   Impression:      No acute abnormality.      Electronically signed by: Anshu Arzate  Date:    08/27/2022  Time:    13:17               Narrative:    EXAMINATION:  XR CHEST AP PORTABLE    CLINICAL HISTORY:  chest pain;    TECHNIQUE:  Single frontal view of the chest was performed.    COMPARISON:  11/16/2019    FINDINGS:  The lungs are clear, with normal appearance of pulmonary vasculature and no pleural effusion or pneumothorax.    The cardiac silhouette is normal in size. The hilar and mediastinal contours are unremarkable.    Bones are intact.                                       Medications   lactated ringers bolus 1,000 mL (1,000 mLs Intravenous New Bag 8/27/22 1424)   meclizine tablet 50 mg (50 mg Oral Given 8/27/22 1302)     Medical Decision Making:   History:   Old Medical Records: I decided to obtain old medical records.  Old Records Summarized: records from clinic visits.  Initial Assessment:   Months of dizziness and atypical ches tpain  Differential Diagnosis:   Acs, cva, mass,  dehydration  Independently Interpreted Test(s):   I have ordered and independently interpreted X-rays - see prior notes.  I have ordered and independently interpreted EKG Reading(s) - see prior notes  Clinical Tests:   Lab Tests: Ordered and Reviewed  Radiological Study: Ordered and Reviewed  Medical Tests: Ordered and Reviewed  ED Management:  Meclizine, ivf, labx, imaging, dc  Other:   I have discussed this case with another health care provider.           ED Course as of 08/27/22 1502   Sat Aug 27, 2022   1415 Has s/s sinusitis, will rx abx [BS]   1424 Ivf administered. Discussed has been ongoing for months and needs further outpatient evaluation  [BS]   1453 Again reiterated that with dizziness ongoing for months and a cause that is not a life threatening identifiable  [BS]      ED Course User Index  [BS] Sheryl Briggs MD             Clinical Impression:   Final diagnoses:  [R42] Dizziness  [H81.13] Benign paroxysmal positional vertigo due to bilateral vestibular disorder (Primary)  [J01.00] Acute non-recurrent maxillary sinusitis  [E86.0] Dehydration               Sheryl Briggs MD  08/27/22 1502

## 2022-08-27 NOTE — FIRST PROVIDER EVALUATION
Medical screening exam completed.  I have conducted a focused provider triage encounter, findings are as follows:    Brief history of present illness:  60 yo cf with c/o dizziness that is worsening, hearing loss in right ear falls and nausea that began last night. Hx breast ca. Taking tamoxifen now. Has MRI ordered by Dr. Perez to r/o acoustic neuroma but pt did not do yet. Has lab order from dr. Palafox as well.   Chief Complaint   Patient presents with    Dizziness     Pt presents c/o dizziness with right ear hearing loss.  Onset months.  Seeing oncologist/ r/o acoustic neuroma. Denies cp/sob.         There were no vitals filed for this visit.    Pertinent physical exam:  vss, nad    Brief workup plan:  labs, imaging,     Preliminary workup initiated; this workup will be continued and followed by the physician or advanced practice provider that is assigned to the patient when roomed.

## 2022-09-04 ENCOUNTER — PATIENT MESSAGE (OUTPATIENT)
Dept: HEMATOLOGY/ONCOLOGY | Facility: CLINIC | Age: 62
End: 2022-09-04
Payer: COMMERCIAL

## 2022-11-02 ENCOUNTER — LAB VISIT (OUTPATIENT)
Dept: LAB | Facility: HOSPITAL | Age: 62
End: 2022-11-02
Attending: INTERNAL MEDICINE
Payer: COMMERCIAL

## 2022-11-02 DIAGNOSIS — C50.511 MALIGNANT NEOPLASM OF LOWER-OUTER QUADRANT OF RIGHT BREAST OF FEMALE, ESTROGEN RECEPTOR POSITIVE: ICD-10-CM

## 2022-11-02 DIAGNOSIS — Z17.0 MALIGNANT NEOPLASM OF LOWER-OUTER QUADRANT OF RIGHT BREAST OF FEMALE, ESTROGEN RECEPTOR POSITIVE: ICD-10-CM

## 2022-11-02 LAB
ALBUMIN SERPL-MCNC: 3.8 GM/DL (ref 3.4–4.8)
ALBUMIN/GLOB SERPL: 1.3 RATIO (ref 1.1–2)
ALP SERPL-CCNC: 110 UNIT/L (ref 40–150)
ALT SERPL-CCNC: 34 UNIT/L (ref 0–55)
AST SERPL-CCNC: 21 UNIT/L (ref 5–34)
BASOPHILS # BLD AUTO: 0.02 X10(3)/MCL (ref 0–0.2)
BASOPHILS NFR BLD AUTO: 0.3 %
BILIRUBIN DIRECT+TOT PNL SERPL-MCNC: 0.3 MG/DL
BUN SERPL-MCNC: 16.3 MG/DL (ref 9.8–20.1)
CALCIUM SERPL-MCNC: 9.9 MG/DL (ref 8.4–10.2)
CHLORIDE SERPL-SCNC: 107 MMOL/L (ref 98–107)
CO2 SERPL-SCNC: 23 MMOL/L (ref 23–31)
CREAT SERPL-MCNC: 0.96 MG/DL (ref 0.55–1.02)
EOSINOPHIL # BLD AUTO: 0.33 X10(3)/MCL (ref 0–0.9)
EOSINOPHIL NFR BLD AUTO: 5 %
ERYTHROCYTE [DISTWIDTH] IN BLOOD BY AUTOMATED COUNT: 14 % (ref 11.5–17)
GFR SERPLBLD CREATININE-BSD FMLA CKD-EPI: >60 MLS/MIN/1.73/M2
GLOBULIN SER-MCNC: 3 GM/DL (ref 2.4–3.5)
GLUCOSE SERPL-MCNC: 202 MG/DL (ref 82–115)
HCT VFR BLD AUTO: 44.3 % (ref 37–47)
HGB BLD-MCNC: 14.2 GM/DL (ref 12–16)
IMM GRANULOCYTES # BLD AUTO: 0.03 X10(3)/MCL (ref 0–0.04)
IMM GRANULOCYTES NFR BLD AUTO: 0.5 %
LYMPHOCYTES # BLD AUTO: 2.88 X10(3)/MCL (ref 0.6–4.6)
LYMPHOCYTES NFR BLD AUTO: 43.6 %
MCH RBC QN AUTO: 30.2 PG (ref 27–31)
MCHC RBC AUTO-ENTMCNC: 32.1 MG/DL (ref 33–36)
MCV RBC AUTO: 94.3 FL (ref 80–94)
MONOCYTES # BLD AUTO: 0.35 X10(3)/MCL (ref 0.1–1.3)
MONOCYTES NFR BLD AUTO: 5.3 %
NEUTROPHILS # BLD AUTO: 3 X10(3)/MCL (ref 2.1–9.2)
NEUTROPHILS NFR BLD AUTO: 45.3 %
PLATELET # BLD AUTO: 278 X10(3)/MCL (ref 130–400)
PMV BLD AUTO: 8.6 FL (ref 7.4–10.4)
POTASSIUM SERPL-SCNC: 4.3 MMOL/L (ref 3.5–5.1)
PROT SERPL-MCNC: 6.8 GM/DL (ref 5.8–7.6)
RBC # BLD AUTO: 4.7 X10(6)/MCL (ref 4.2–5.4)
SODIUM SERPL-SCNC: 138 MMOL/L (ref 136–145)
WBC # SPEC AUTO: 6.6 X10(3)/MCL (ref 4.5–11.5)

## 2022-11-02 PROCEDURE — 36415 COLL VENOUS BLD VENIPUNCTURE: CPT

## 2022-11-02 PROCEDURE — 80053 COMPREHEN METABOLIC PANEL: CPT

## 2022-11-02 PROCEDURE — 85025 COMPLETE CBC W/AUTO DIFF WBC: CPT

## 2022-11-04 ENCOUNTER — PATIENT MESSAGE (OUTPATIENT)
Dept: HEPATOLOGY | Facility: CLINIC | Age: 62
End: 2022-11-04
Payer: COMMERCIAL

## 2022-11-07 NOTE — PROGRESS NOTES
Heme/Onc Progress Note    PATIENT: Jessie Greene  MRN: [id number]  DATE: 11/8/2022  Chief Complaint: Follow-up (3 month f/u, states she has been having some R sided abdominal pain)  left sided    Oncology History Overview Note   05/04/2022::  Interim history:  The patient was last seen in the office on 04/12/2022.  Her aromatase inhibitor Arimidex was discontinued due to bone pain..  She underwent a nuclear medicine bone scan on 04/28/2022.  That nuclear medicine bone scan failed to reveal any significant suggestion of skeletal metastatic disease.  It was compared to her old CT from March of 2020. And this mild activity within the mid to lower thoracic spine is no significant change.  And her bone density is normal. She was thought to have mets at one time to thyroid, but that work up was not malignant in repeat evaluation and review    Diagnosis T2N1a, Stage IIb, Right lower outer quadrant infiltrating ductal carcinoma, diagnosed 10/23/19    Current Treatment:   Changed to Tamoxifen 7/5/22--stopped due to falls and vertigo worse  Arimidex (2/27/20)   Previous Femara (1/2/20) stopped 2/13/20 due to intolerability    Treatment History:  10/21/19: 2.8 cm mass in the right breast 8 o'clock position highly suggestive of malignancy with additional hypoechoic areas at 12:00, 2:00 9:00 and 10:00 suspicious for multicentric disease   highly suspicious of malignancy.  10/23/19: Ultrasound-guided biopsy of right breast 2 different sites: right breast biopsy 8 o'clock position 2 cm from the nipple invasive ductal carcinoma low-grade at least 1.6 cm with   microcalcifications with DCIS   ER 93.9% UT 90.8% Ki-67 23.7% HER-2/quang not expressed by FISH  11/6/19: CT C/A/P and bone scan-No conclusive evidence of osseous metastatic disease sclerotic lesion seen in the pelvis and the sacrum on comparison CT did not have clearly associated   uptake on the current bone scan but may be obscured by radiotracer uptake in the  bladder. Degenerative uptake at L3. Uptake at the frontal bones represents hyperostosis frontalis.  11/18/19: Bilateral mastectomy sentinel lymph node biopsy    Pathology: Invasive ductal carcinoma    2 foci: A. = 2.3 cm at the 8 o'clock position B.= 0.15 cm   G1, ER/AR positive, HER2 negative   Clear margins, Multiple intraductal papillomas   [place] lymph node 1 out of 2 positive measuring 1.2 cm    Left breast intraductal papillomas bilateral left sentinel lymph nodes negative.     Additional axillary content of the right and left without evidence of metastatic disease.   Summary: T2 N1 aM0 = right breast 1 out of 5 lymph nodes involved, negative margins   ER 93.9%, AR 90.8% HER-2/quang negative  ONCO-DX score 11  5/18/20: EGD,  and abdominal ultrasound by Dr. Schneider for pancreatic cancer screening    9/22/2020: Outside pathology report from primary care's office show metastatic breast carcinoma metastasis to the thyroid.  Due to the above.  Recommend the patient have a repeat CBC, CMP, tumor marker CA-15-3 and CA-27-29, and a PET/CT for thyroid metastasis from breast carcinoma to restage her.    10/2/2020 PET CT scan, periportal lymph node measures 12 mm in short axis with SUV 2.6, no other hypermetabolic lymph nodes identified.  No suspicious osseous metabolism.  Minimally enlarged periportal lymph node is mild hypermetabolism but the size is stable going back to 2019 this lymph node is indeterminate.    Partial right thyroidectomy, nodule hyperplasia some of the nodules show fibrosis and cystic generation calcification, one attached unremarkable parathyroid gland and 1 parathyroid lymph node negative for tumor.  No evidence of malignancy.  The review of the original tumor  show fragments of parathyroid tissue r       Malignant neoplasm of lower-outer quadrant of female breast   11/18/2019 Cancer Staged    Staging form: Breast, AJCC 8th Edition  - Pathologic stage from 11/18/2019: Stage IA (pT2, pN1a(sn),  "cM0, G1, ER+, LA+, HER2-, Oncotype DX score: 11)           11/08/2022    Last visit, patient has right-sided hearing loss due to infection, she is had injections by ENT with some improvement however she continues to have chronic vertigo.  Her newest complaint is her left-sided upper abdominal discomfort.  It happens more at night and with movement.  She denies any cough or shortness of breath.  It is not tender to palpation.  There is no rash.  This patient has been through letrozole, Aromasin, Arimidex, and now tamoxifen.  She is been intolerant to all of the above.  "They need to take those drugs off the market"              Past Medical History:   Diagnosis Date    Diabetes mellitus     Diverticulitis     Ectopic pregnancy     Fatty liver     Kidney stones         Current Outpatient Medications:     albuterol (PROVENTIL) 5 mg/mL nebulizer solution, Take 2.5 mg by nebulization every 6 (six) hours as needed for Wheezing., Disp: , Rfl:     FLUoxetine 40 MG capsule, Take 40 mg by mouth once daily., Disp: , Rfl:     fluticasone-umeclidin-vilanter (TRELEGY ELLIPTA) 100-62.5-25 mcg DsDv, Take by mouth., Disp: , Rfl:     meclizine (ANTIVERT) 25 mg tablet, Take 1 tablet (25 mg total) by mouth 3 (three) times daily as needed for Dizziness. (Patient taking differently: Take 12.5 mg by mouth 3 (three) times daily as needed for Dizziness.), Disp: 20 tablet, Rfl: 0    montelukast (SINGULAIR) 10 mg tablet, Take 10 mg by mouth every other day., Disp: , Rfl:     pioglitazone (ACTOS) 30 MG tablet, Take 30 mg by mouth once daily., Disp: , Rfl:     rosuvastatin (CRESTOR) 10 MG tablet, Take 10 mg by mouth once daily., Disp: , Rfl:     SOLIQUA 100/33 100 unit-33 mcg/mL InPn pen, INJECT 60 UNITS SUBCUTANEOUSLY ONCE DAILY, Disp: , Rfl:     spironolactone (ALDACTONE) 50 MG tablet, Take 50 mg by mouth once daily., Disp: , Rfl:     TRIJARDY XR 25-5-1,000 mg TBph, Take 1 tablet by mouth once daily., Disp: , Rfl:     ketoconazole (NIZORAL) 2 " % cream, Apply topically., Disp: , Rfl:     losartan (COZAAR) 50 MG tablet, Take 50 mg by mouth once daily., Disp: , Rfl:      Review of Systems:   Pertinent positives and negatives included in the HPI. Otherwise a complete review of systems is negative.      Objective:     Vitals:    11/08/22 1037   BP: (!) 147/88   Pulse: 78   Temp: 98.4 °F (36.9 °C)         Physical Exam  Constitutional:       General: She is not in acute distress.     Appearance: Normal appearance. She is not ill-appearing, toxic-appearing or diaphoretic.   HENT:      Head: Normocephalic and atraumatic.   Eyes:      Extraocular Movements: Extraocular movements intact.      Pupils: Pupils are equal, round, and reactive to light.   Cardiovascular:      Rate and Rhythm: Normal rate and regular rhythm.   Pulmonary:      Effort: Pulmonary effort is normal.      Breath sounds: Normal breath sounds.   Abdominal:      General: Abdomen is flat. Bowel sounds are normal.      Palpations: Abdomen is soft.          Comments: Patient points this areas the area of tenderness that occurs with movement and with lying flat, not reproducible on today's exam   Musculoskeletal:      Cervical back: Neck supple.      Right lower leg: No edema.      Left lower leg: No edema.   Skin:     General: Skin is warm.   Neurological:      General: No focal deficit present.      Mental Status: She is alert and oriented to person, place, and time.      Gait: Gait normal.   Psychiatric:         Mood and Affect: Mood normal.         Behavior: Behavior normal.         Thought Content: Thought content normal.         Judgment: Judgment normal.          Lab Review:  CBC:   Recent Labs     11/02/22  0730   WBC 6.6   RBC 4.70   HGB 14.2   HCT 44.3        CMP:   Recent Labs     11/02/22  0730      K 4.3   CO2 23   BUN 16.3   CREATININE 0.96   CALCIUM 9.9   ALBUMIN 3.8   BILITOT 0.3   ALKPHOS 110   AST 21   ALT 34      Assessment and Plan            Oncology     Malignant  neoplasm of lower-outer quadrant of female breast - Primary               Genetic     Monoallelic mutation of CHIQUIS gene     No evidence of disease  At 1 point there was a question of thyroid disease from breast cancer however her scans and thyroidectomy did not show any evidence of malignancy.  Intolerance to Femara, Arimidex, and  Aromasin and tamoxifen----risk of relapse and recurrence without medication discussed  Patient offered retry of medication again, low-dose tamoxifen, she does not want to go on preventative medication, encouraged exercise and weight loss  Continue F/u with Dr. Schneider with a CHIQUIS and CORBETT  She will switch to Dr. Adkins in Jan with primary care  Encouraged continued follow-up with ENT giving hearing loss, as her hearing may get worse over time   Follow this patient yearly until November of 2024 then consider discharge to primary care      Follow up in about 1 year (around 11/8/2023) for cbc,cmp.      Matias Castañeda MD

## 2022-11-08 ENCOUNTER — OFFICE VISIT (OUTPATIENT)
Dept: HEMATOLOGY/ONCOLOGY | Facility: CLINIC | Age: 62
End: 2022-11-08
Payer: COMMERCIAL

## 2022-11-08 VITALS
HEART RATE: 78 BPM | TEMPERATURE: 98 F | WEIGHT: 231.25 LBS | BODY MASS INDEX: 33.11 KG/M2 | DIASTOLIC BLOOD PRESSURE: 88 MMHG | OXYGEN SATURATION: 96 % | HEIGHT: 70 IN | SYSTOLIC BLOOD PRESSURE: 147 MMHG

## 2022-11-08 DIAGNOSIS — Z17.0 MALIGNANT NEOPLASM OF LOWER-OUTER QUADRANT OF RIGHT BREAST OF FEMALE, ESTROGEN RECEPTOR POSITIVE: Primary | ICD-10-CM

## 2022-11-08 DIAGNOSIS — C50.511 MALIGNANT NEOPLASM OF LOWER-OUTER QUADRANT OF RIGHT BREAST OF FEMALE, ESTROGEN RECEPTOR POSITIVE: Primary | ICD-10-CM

## 2022-11-08 PROCEDURE — 3077F PR MOST RECENT SYSTOLIC BLOOD PRESSURE >= 140 MM HG: ICD-10-PCS | Mod: CPTII,S$GLB,, | Performed by: INTERNAL MEDICINE

## 2022-11-08 PROCEDURE — 99214 PR OFFICE/OUTPT VISIT, EST, LEVL IV, 30-39 MIN: ICD-10-PCS | Mod: S$GLB,,, | Performed by: INTERNAL MEDICINE

## 2022-11-08 PROCEDURE — 3079F DIAST BP 80-89 MM HG: CPT | Mod: CPTII,S$GLB,, | Performed by: INTERNAL MEDICINE

## 2022-11-08 PROCEDURE — 3008F BODY MASS INDEX DOCD: CPT | Mod: CPTII,S$GLB,, | Performed by: INTERNAL MEDICINE

## 2022-11-08 PROCEDURE — 4010F PR ACE/ARB THEARPY RXD/TAKEN: ICD-10-PCS | Mod: CPTII,S$GLB,, | Performed by: INTERNAL MEDICINE

## 2022-11-08 PROCEDURE — 99999 PR PBB SHADOW E&M-EST. PATIENT-LVL IV: CPT | Mod: PBBFAC,,, | Performed by: INTERNAL MEDICINE

## 2022-11-08 PROCEDURE — 3079F PR MOST RECENT DIASTOLIC BLOOD PRESSURE 80-89 MM HG: ICD-10-PCS | Mod: CPTII,S$GLB,, | Performed by: INTERNAL MEDICINE

## 2022-11-08 PROCEDURE — 4010F ACE/ARB THERAPY RXD/TAKEN: CPT | Mod: CPTII,S$GLB,, | Performed by: INTERNAL MEDICINE

## 2022-11-08 PROCEDURE — 99214 OFFICE O/P EST MOD 30 MIN: CPT | Mod: S$GLB,,, | Performed by: INTERNAL MEDICINE

## 2022-11-08 PROCEDURE — 99999 PR PBB SHADOW E&M-EST. PATIENT-LVL IV: ICD-10-PCS | Mod: PBBFAC,,, | Performed by: INTERNAL MEDICINE

## 2022-11-08 PROCEDURE — 3008F PR BODY MASS INDEX (BMI) DOCUMENTED: ICD-10-PCS | Mod: CPTII,S$GLB,, | Performed by: INTERNAL MEDICINE

## 2022-11-08 PROCEDURE — 1160F PR REVIEW ALL MEDS BY PRESCRIBER/CLIN PHARMACIST DOCUMENTED: ICD-10-PCS | Mod: CPTII,S$GLB,, | Performed by: INTERNAL MEDICINE

## 2022-11-08 PROCEDURE — 1160F RVW MEDS BY RX/DR IN RCRD: CPT | Mod: CPTII,S$GLB,, | Performed by: INTERNAL MEDICINE

## 2022-11-08 PROCEDURE — 1159F PR MEDICATION LIST DOCUMENTED IN MEDICAL RECORD: ICD-10-PCS | Mod: CPTII,S$GLB,, | Performed by: INTERNAL MEDICINE

## 2022-11-08 PROCEDURE — 1159F MED LIST DOCD IN RCRD: CPT | Mod: CPTII,S$GLB,, | Performed by: INTERNAL MEDICINE

## 2022-11-08 PROCEDURE — 3077F SYST BP >= 140 MM HG: CPT | Mod: CPTII,S$GLB,, | Performed by: INTERNAL MEDICINE

## 2022-11-08 RX ORDER — EMPAGLIFLOZIN, LINAGLIPTIN, METFORMIN HYDROCHLORIDE 25; 5; 1000 MG/1; MG/1; MG/1
1 TABLET, EXTENDED RELEASE ORAL DAILY
COMMUNITY
Start: 2022-08-29 | End: 2023-11-08

## 2022-12-16 ENCOUNTER — PATIENT OUTREACH (OUTPATIENT)
Dept: ADMINISTRATIVE | Facility: HOSPITAL | Age: 62
End: 2022-12-16
Payer: COMMERCIAL

## 2023-02-01 ENCOUNTER — LAB VISIT (OUTPATIENT)
Dept: LAB | Facility: HOSPITAL | Age: 63
End: 2023-02-01
Attending: NURSE PRACTITIONER
Payer: COMMERCIAL

## 2023-02-01 DIAGNOSIS — R10.32 ABDOMINAL PAIN, LEFT LOWER QUADRANT: Primary | ICD-10-CM

## 2023-02-01 DIAGNOSIS — E78.2 MIXED HYPERLIPIDEMIA: ICD-10-CM

## 2023-02-01 LAB
ALBUMIN SERPL-MCNC: 3.9 G/DL (ref 3.4–4.8)
ALBUMIN/GLOB SERPL: 1.3 RATIO (ref 1.1–2)
ALP SERPL-CCNC: 88 UNIT/L (ref 40–150)
ALT SERPL-CCNC: 41 UNIT/L (ref 0–55)
APPEARANCE UR: CLEAR
AST SERPL-CCNC: 32 UNIT/L (ref 5–34)
BACTERIA #/AREA URNS AUTO: NORMAL /HPF
BASOPHILS # BLD AUTO: 0.05 X10(3)/MCL (ref 0–0.2)
BASOPHILS NFR BLD AUTO: 0.8 %
BILIRUB UR QL STRIP.AUTO: NEGATIVE MG/DL
BILIRUBIN DIRECT+TOT PNL SERPL-MCNC: 0.5 MG/DL
BUN SERPL-MCNC: 19.2 MG/DL (ref 9.8–20.1)
CALCIUM SERPL-MCNC: 9.7 MG/DL (ref 8.4–10.2)
CHLORIDE SERPL-SCNC: 101 MMOL/L (ref 98–107)
CO2 SERPL-SCNC: 27 MMOL/L (ref 23–31)
COLOR UR AUTO: YELLOW
CREAT SERPL-MCNC: 0.92 MG/DL (ref 0.55–1.02)
EOSINOPHIL # BLD AUTO: 0.35 X10(3)/MCL (ref 0–0.9)
EOSINOPHIL NFR BLD AUTO: 5.8 %
ERYTHROCYTE [DISTWIDTH] IN BLOOD BY AUTOMATED COUNT: 15.2 % (ref 11.5–17)
GFR SERPLBLD CREATININE-BSD FMLA CKD-EPI: >60 MLS/MIN/1.73/M2
GLOBULIN SER-MCNC: 3.1 GM/DL (ref 2.4–3.5)
GLUCOSE SERPL-MCNC: 104 MG/DL (ref 82–115)
GLUCOSE UR QL STRIP.AUTO: ABNORMAL MG/DL
HCT VFR BLD AUTO: 42 % (ref 37–47)
HGB BLD-MCNC: 13.4 GM/DL (ref 12–16)
IMM GRANULOCYTES # BLD AUTO: 0.03 X10(3)/MCL (ref 0–0.04)
IMM GRANULOCYTES NFR BLD AUTO: 0.5 %
KETONES UR QL STRIP.AUTO: NEGATIVE MG/DL
LEUKOCYTE ESTERASE UR QL STRIP.AUTO: NEGATIVE UNIT/L
LYMPHOCYTES # BLD AUTO: 2.08 X10(3)/MCL (ref 0.6–4.6)
LYMPHOCYTES NFR BLD AUTO: 34.4 %
MCH RBC QN AUTO: 30.4 PG
MCHC RBC AUTO-ENTMCNC: 31.9 MG/DL (ref 33–36)
MCV RBC AUTO: 95.2 FL (ref 80–94)
MONOCYTES # BLD AUTO: 0.4 X10(3)/MCL (ref 0.1–1.3)
MONOCYTES NFR BLD AUTO: 6.6 %
NEUTROPHILS # BLD AUTO: 3.13 X10(3)/MCL (ref 2.1–9.2)
NEUTROPHILS NFR BLD AUTO: 51.9 %
NITRITE UR QL STRIP.AUTO: NEGATIVE
NRBC BLD AUTO-RTO: 0 %
PH UR STRIP.AUTO: 6 [PH]
PLATELET # BLD AUTO: 303 X10(3)/MCL (ref 130–400)
PMV BLD AUTO: 8.8 FL (ref 7.4–10.4)
POTASSIUM SERPL-SCNC: 4.1 MMOL/L (ref 3.5–5.1)
PROT SERPL-MCNC: 7 GM/DL (ref 5.8–7.6)
PROT UR QL STRIP.AUTO: NEGATIVE MG/DL
RBC # BLD AUTO: 4.41 X10(6)/MCL (ref 4.2–5.4)
RBC #/AREA URNS AUTO: <5 /HPF
RBC UR QL AUTO: NEGATIVE UNIT/L
SODIUM SERPL-SCNC: 137 MMOL/L (ref 136–145)
SP GR UR STRIP.AUTO: >=1.04 (ref 1–1.03)
SQUAMOUS #/AREA URNS AUTO: <5 /HPF
UROBILINOGEN UR STRIP-ACNC: 0.2 MG/DL
WBC # SPEC AUTO: 6 X10(3)/MCL (ref 4.5–11.5)
WBC #/AREA URNS AUTO: <5 /HPF

## 2023-02-01 PROCEDURE — 36415 COLL VENOUS BLD VENIPUNCTURE: CPT

## 2023-02-01 PROCEDURE — 85025 COMPLETE CBC W/AUTO DIFF WBC: CPT

## 2023-02-01 PROCEDURE — 80053 COMPREHEN METABOLIC PANEL: CPT

## 2023-02-01 PROCEDURE — 81001 URINALYSIS AUTO W/SCOPE: CPT

## 2023-06-08 DIAGNOSIS — R89.6 ABNORMAL BLADDER CYTOLOGY: Primary | ICD-10-CM

## 2023-06-11 ENCOUNTER — HOSPITAL ENCOUNTER (OUTPATIENT)
Facility: HOSPITAL | Age: 63
Discharge: HOME | End: 2023-06-13
Attending: EMERGENCY MEDICINE | Admitting: INTERNAL MEDICINE
Payer: COMMERCIAL

## 2023-06-11 DIAGNOSIS — R07.9 CHEST PAIN: ICD-10-CM

## 2023-06-11 DIAGNOSIS — K57.32 SIGMOID DIVERTICULITIS: Primary | ICD-10-CM

## 2023-06-11 DIAGNOSIS — R10.32 ACUTE LEFT LOWER QUADRANT PAIN: ICD-10-CM

## 2023-06-11 LAB
ALBUMIN SERPL-MCNC: 3.7 G/DL (ref 3.4–4.8)
ALBUMIN/GLOB SERPL: 0.9 RATIO (ref 1.1–2)
ALP SERPL-CCNC: 91 UNIT/L (ref 40–150)
ALT SERPL-CCNC: 27 UNIT/L (ref 0–55)
APPEARANCE UR: CLEAR
AST SERPL-CCNC: 20 UNIT/L (ref 5–34)
BACTERIA #/AREA URNS AUTO: NORMAL /HPF
BASOPHILS # BLD AUTO: 0.02 X10(3)/MCL
BASOPHILS NFR BLD AUTO: 0.2 %
BILIRUB UR QL STRIP.AUTO: NEGATIVE MG/DL
BILIRUBIN DIRECT+TOT PNL SERPL-MCNC: 0.8 MG/DL
BUN SERPL-MCNC: 15.6 MG/DL (ref 9.8–20.1)
CALCIUM SERPL-MCNC: 9.8 MG/DL (ref 8.4–10.2)
CHLORIDE SERPL-SCNC: 105 MMOL/L (ref 98–107)
CO2 SERPL-SCNC: 24 MMOL/L (ref 23–31)
COLOR UR: YELLOW
CREAT SERPL-MCNC: 0.91 MG/DL (ref 0.55–1.02)
EOSINOPHIL # BLD AUTO: 0.07 X10(3)/MCL (ref 0–0.9)
EOSINOPHIL NFR BLD AUTO: 0.6 %
ERYTHROCYTE [DISTWIDTH] IN BLOOD BY AUTOMATED COUNT: 15.4 % (ref 11.5–17)
GFR SERPLBLD CREATININE-BSD FMLA CKD-EPI: >60 MLS/MIN/1.73/M2
GLOBULIN SER-MCNC: 4 GM/DL (ref 2.4–3.5)
GLUCOSE SERPL-MCNC: 139 MG/DL (ref 82–115)
GLUCOSE UR QL STRIP.AUTO: ABNORMAL MG/DL
HCT VFR BLD AUTO: 41.9 % (ref 37–47)
HGB BLD-MCNC: 13.8 G/DL (ref 12–16)
IMM GRANULOCYTES # BLD AUTO: 0.05 X10(3)/MCL (ref 0–0.04)
IMM GRANULOCYTES NFR BLD AUTO: 0.5 %
KETONES UR QL STRIP.AUTO: NEGATIVE MG/DL
LEUKOCYTE ESTERASE UR QL STRIP.AUTO: NEGATIVE UNIT/L
LIPASE SERPL-CCNC: 23 U/L
LYMPHOCYTES # BLD AUTO: 2.22 X10(3)/MCL (ref 0.6–4.6)
LYMPHOCYTES NFR BLD AUTO: 20 %
MCH RBC QN AUTO: 30.8 PG (ref 27–31)
MCHC RBC AUTO-ENTMCNC: 32.9 G/DL (ref 33–36)
MCV RBC AUTO: 93.5 FL (ref 80–94)
MONOCYTES # BLD AUTO: 0.8 X10(3)/MCL (ref 0.1–1.3)
MONOCYTES NFR BLD AUTO: 7.2 %
NEUTROPHILS # BLD AUTO: 7.95 X10(3)/MCL (ref 2.1–9.2)
NEUTROPHILS NFR BLD AUTO: 71.5 %
NITRITE UR QL STRIP.AUTO: NEGATIVE
NRBC BLD AUTO-RTO: 0 %
PH UR STRIP.AUTO: 5.5 [PH]
PLATELET # BLD AUTO: 302 X10(3)/MCL (ref 130–400)
PMV BLD AUTO: 8.5 FL (ref 7.4–10.4)
POCT GLUCOSE: 135 MG/DL (ref 70–110)
POCT GLUCOSE: 140 MG/DL (ref 70–110)
POTASSIUM SERPL-SCNC: 4.2 MMOL/L (ref 3.5–5.1)
PROT SERPL-MCNC: 7.7 GM/DL (ref 5.8–7.6)
PROT UR QL STRIP.AUTO: NEGATIVE MG/DL
RBC # BLD AUTO: 4.48 X10(6)/MCL (ref 4.2–5.4)
RBC #/AREA URNS AUTO: <5 /HPF
RBC UR QL AUTO: NEGATIVE UNIT/L
SODIUM SERPL-SCNC: 137 MMOL/L (ref 136–145)
SP GR UR STRIP.AUTO: >=1.04 (ref 1–1.03)
SQUAMOUS #/AREA URNS AUTO: <5 /HPF
UROBILINOGEN UR STRIP-ACNC: 0.2 MG/DL
WBC # SPEC AUTO: 11.11 X10(3)/MCL (ref 4.5–11.5)
WBC #/AREA URNS AUTO: <5 /HPF

## 2023-06-11 PROCEDURE — 96368 THER/DIAG CONCURRENT INF: CPT

## 2023-06-11 PROCEDURE — 11000001 HC ACUTE MED/SURG PRIVATE ROOM

## 2023-06-11 PROCEDURE — 25000003 PHARM REV CODE 250: Performed by: EMERGENCY MEDICINE

## 2023-06-11 PROCEDURE — 99285 EMERGENCY DEPT VISIT HI MDM: CPT | Mod: 25

## 2023-06-11 PROCEDURE — 25000003 PHARM REV CODE 250: Performed by: NURSE PRACTITIONER

## 2023-06-11 PROCEDURE — 63600175 PHARM REV CODE 636 W HCPCS: Performed by: EMERGENCY MEDICINE

## 2023-06-11 PROCEDURE — S0030 INJECTION, METRONIDAZOLE: HCPCS | Performed by: EMERGENCY MEDICINE

## 2023-06-11 PROCEDURE — 96375 TX/PRO/DX INJ NEW DRUG ADDON: CPT

## 2023-06-11 PROCEDURE — 25500020 PHARM REV CODE 255: Performed by: EMERGENCY MEDICINE

## 2023-06-11 PROCEDURE — 96366 THER/PROPH/DIAG IV INF ADDON: CPT

## 2023-06-11 PROCEDURE — 83690 ASSAY OF LIPASE: CPT | Performed by: EMERGENCY MEDICINE

## 2023-06-11 PROCEDURE — 25000003 PHARM REV CODE 250: Performed by: INTERNAL MEDICINE

## 2023-06-11 PROCEDURE — G0378 HOSPITAL OBSERVATION PER HR: HCPCS

## 2023-06-11 PROCEDURE — 81001 URINALYSIS AUTO W/SCOPE: CPT | Performed by: EMERGENCY MEDICINE

## 2023-06-11 PROCEDURE — 63600175 PHARM REV CODE 636 W HCPCS: Performed by: NURSE PRACTITIONER

## 2023-06-11 PROCEDURE — 85025 COMPLETE CBC W/AUTO DIFF WBC: CPT | Performed by: EMERGENCY MEDICINE

## 2023-06-11 PROCEDURE — 96361 HYDRATE IV INFUSION ADD-ON: CPT

## 2023-06-11 PROCEDURE — 96372 THER/PROPH/DIAG INJ SC/IM: CPT | Mod: 59 | Performed by: NURSE PRACTITIONER

## 2023-06-11 PROCEDURE — 80053 COMPREHEN METABOLIC PANEL: CPT | Performed by: EMERGENCY MEDICINE

## 2023-06-11 PROCEDURE — 96365 THER/PROPH/DIAG IV INF INIT: CPT

## 2023-06-11 RX ORDER — ACETAMINOPHEN 325 MG/1
650 TABLET ORAL EVERY 4 HOURS PRN
Status: DISCONTINUED | OUTPATIENT
Start: 2023-06-11 | End: 2023-06-13 | Stop reason: HOSPADM

## 2023-06-11 RX ORDER — FLUOXETINE HYDROCHLORIDE 20 MG/1
40 CAPSULE ORAL 2 TIMES DAILY
Status: DISCONTINUED | OUTPATIENT
Start: 2023-06-11 | End: 2023-06-13 | Stop reason: HOSPADM

## 2023-06-11 RX ORDER — ENOXAPARIN SODIUM 100 MG/ML
40 INJECTION SUBCUTANEOUS EVERY 24 HOURS
Status: DISCONTINUED | OUTPATIENT
Start: 2023-06-11 | End: 2023-06-13 | Stop reason: HOSPADM

## 2023-06-11 RX ORDER — SODIUM CHLORIDE 0.9 % (FLUSH) 0.9 %
10 SYRINGE (ML) INJECTION EVERY 12 HOURS PRN
Status: DISCONTINUED | OUTPATIENT
Start: 2023-06-11 | End: 2023-06-13 | Stop reason: HOSPADM

## 2023-06-11 RX ORDER — LOSARTAN POTASSIUM 25 MG/1
25 TABLET ORAL DAILY
Status: DISCONTINUED | OUTPATIENT
Start: 2023-06-11 | End: 2023-06-12

## 2023-06-11 RX ORDER — INSULIN ASPART 100 [IU]/ML
0-5 INJECTION, SOLUTION INTRAVENOUS; SUBCUTANEOUS
Status: DISCONTINUED | OUTPATIENT
Start: 2023-06-11 | End: 2023-06-13 | Stop reason: HOSPADM

## 2023-06-11 RX ORDER — SODIUM CHLORIDE, SODIUM LACTATE, POTASSIUM CHLORIDE, CALCIUM CHLORIDE 600; 310; 30; 20 MG/100ML; MG/100ML; MG/100ML; MG/100ML
INJECTION, SOLUTION INTRAVENOUS CONTINUOUS
Status: DISCONTINUED | OUTPATIENT
Start: 2023-06-11 | End: 2023-06-13 | Stop reason: HOSPADM

## 2023-06-11 RX ORDER — EMPAGLIFLOZIN 25 MG/1
25 TABLET, FILM COATED ORAL DAILY
COMMUNITY
Start: 2023-06-06

## 2023-06-11 RX ORDER — LINAGLIPTIN 5 MG/1
5 TABLET, FILM COATED ORAL DAILY
COMMUNITY
Start: 2023-06-06

## 2023-06-11 RX ORDER — ONDANSETRON 2 MG/ML
4 INJECTION INTRAMUSCULAR; INTRAVENOUS
Status: COMPLETED | OUTPATIENT
Start: 2023-06-11 | End: 2023-06-11

## 2023-06-11 RX ORDER — METFORMIN HYDROCHLORIDE 1000 MG/1
1000 TABLET ORAL DAILY
COMMUNITY
Start: 2023-06-06

## 2023-06-11 RX ORDER — SPIRONOLACTONE 25 MG/1
50 TABLET ORAL DAILY
Status: DISCONTINUED | OUTPATIENT
Start: 2023-06-11 | End: 2023-06-13 | Stop reason: HOSPADM

## 2023-06-11 RX ORDER — KETOROLAC TROMETHAMINE 30 MG/ML
15 INJECTION, SOLUTION INTRAMUSCULAR; INTRAVENOUS EVERY 6 HOURS PRN
Status: DISCONTINUED | OUTPATIENT
Start: 2023-06-11 | End: 2023-06-13 | Stop reason: HOSPADM

## 2023-06-11 RX ORDER — CIPROFLOXACIN 2 MG/ML
400 INJECTION, SOLUTION INTRAVENOUS
Status: COMPLETED | OUTPATIENT
Start: 2023-06-11 | End: 2023-06-11

## 2023-06-11 RX ORDER — ATORVASTATIN CALCIUM 10 MG/1
10 TABLET, FILM COATED ORAL DAILY
Status: DISCONTINUED | OUTPATIENT
Start: 2023-06-11 | End: 2023-06-13 | Stop reason: HOSPADM

## 2023-06-11 RX ORDER — METRONIDAZOLE 500 MG/100ML
500 INJECTION, SOLUTION INTRAVENOUS
Status: DISCONTINUED | OUTPATIENT
Start: 2023-06-11 | End: 2023-06-13 | Stop reason: HOSPADM

## 2023-06-11 RX ORDER — GLUCAGON 1 MG
1 KIT INJECTION
Status: DISCONTINUED | OUTPATIENT
Start: 2023-06-11 | End: 2023-06-13 | Stop reason: HOSPADM

## 2023-06-11 RX ORDER — NALOXONE HCL 0.4 MG/ML
0.02 VIAL (ML) INJECTION
Status: DISCONTINUED | OUTPATIENT
Start: 2023-06-11 | End: 2023-06-13 | Stop reason: HOSPADM

## 2023-06-11 RX ORDER — AMOXICILLIN 250 MG
2 CAPSULE ORAL 2 TIMES DAILY
Status: DISCONTINUED | OUTPATIENT
Start: 2023-06-11 | End: 2023-06-13 | Stop reason: HOSPADM

## 2023-06-11 RX ORDER — IBUPROFEN 200 MG
16 TABLET ORAL
Status: DISCONTINUED | OUTPATIENT
Start: 2023-06-11 | End: 2023-06-13 | Stop reason: HOSPADM

## 2023-06-11 RX ORDER — IBUPROFEN 200 MG
24 TABLET ORAL
Status: DISCONTINUED | OUTPATIENT
Start: 2023-06-11 | End: 2023-06-13 | Stop reason: HOSPADM

## 2023-06-11 RX ORDER — MORPHINE SULFATE 4 MG/ML
4 INJECTION, SOLUTION INTRAMUSCULAR; INTRAVENOUS
Status: COMPLETED | OUTPATIENT
Start: 2023-06-11 | End: 2023-06-11

## 2023-06-11 RX ORDER — ONDANSETRON 2 MG/ML
4 INJECTION INTRAMUSCULAR; INTRAVENOUS EVERY 6 HOURS PRN
Status: DISCONTINUED | OUTPATIENT
Start: 2023-06-11 | End: 2023-06-13 | Stop reason: HOSPADM

## 2023-06-11 RX ORDER — CIPROFLOXACIN 2 MG/ML
400 INJECTION, SOLUTION INTRAVENOUS
Status: DISCONTINUED | OUTPATIENT
Start: 2023-06-11 | End: 2023-06-13 | Stop reason: HOSPADM

## 2023-06-11 RX ORDER — MORPHINE SULFATE 4 MG/ML
2 INJECTION, SOLUTION INTRAMUSCULAR; INTRAVENOUS EVERY 4 HOURS PRN
Status: DISCONTINUED | OUTPATIENT
Start: 2023-06-11 | End: 2023-06-13 | Stop reason: HOSPADM

## 2023-06-11 RX ORDER — PIOGLITAZONEHYDROCHLORIDE 15 MG/1
30 TABLET ORAL DAILY
Status: DISCONTINUED | OUTPATIENT
Start: 2023-06-11 | End: 2023-06-13 | Stop reason: HOSPADM

## 2023-06-11 RX ADMIN — ACETAMINOPHEN 325MG 650 MG: 325 TABLET ORAL at 02:06

## 2023-06-11 RX ADMIN — ONDANSETRON 4 MG: 2 INJECTION INTRAMUSCULAR; INTRAVENOUS at 05:06

## 2023-06-11 RX ADMIN — CIPROFLOXACIN 400 MG: 2 INJECTION, SOLUTION INTRAVENOUS at 05:06

## 2023-06-11 RX ADMIN — SENNOSIDES AND DOCUSATE SODIUM 2 TABLET: 50; 8.6 TABLET ORAL at 10:06

## 2023-06-11 RX ADMIN — METRONIDAZOLE 500 MG: 500 INJECTION, SOLUTION INTRAVENOUS at 06:06

## 2023-06-11 RX ADMIN — METRONIDAZOLE 500 MG: 500 INJECTION, SOLUTION INTRAVENOUS at 11:06

## 2023-06-11 RX ADMIN — ENOXAPARIN SODIUM 40 MG: 40 INJECTION SUBCUTANEOUS at 05:06

## 2023-06-11 RX ADMIN — FLUOXETINE 40 MG: 20 CAPSULE ORAL at 11:06

## 2023-06-11 RX ADMIN — IOPAMIDOL 100 ML: 755 INJECTION, SOLUTION INTRAVENOUS at 05:06

## 2023-06-11 RX ADMIN — ATORVASTATIN CALCIUM 10 MG: 10 TABLET, FILM COATED ORAL at 11:06

## 2023-06-11 RX ADMIN — KETOROLAC TROMETHAMINE 15 MG: 30 INJECTION, SOLUTION INTRAMUSCULAR; INTRAVENOUS at 09:06

## 2023-06-11 RX ADMIN — CIPROFLOXACIN 400 MG: 2 INJECTION, SOLUTION INTRAVENOUS at 06:06

## 2023-06-11 RX ADMIN — SODIUM CHLORIDE, POTASSIUM CHLORIDE, SODIUM LACTATE AND CALCIUM CHLORIDE: 600; 310; 30; 20 INJECTION, SOLUTION INTRAVENOUS at 09:06

## 2023-06-11 RX ADMIN — KETOROLAC TROMETHAMINE 15 MG: 30 INJECTION, SOLUTION INTRAMUSCULAR; INTRAVENOUS at 08:06

## 2023-06-11 RX ADMIN — SODIUM CHLORIDE 3171 ML: 9 INJECTION, SOLUTION INTRAVENOUS at 05:06

## 2023-06-11 RX ADMIN — FLUOXETINE 40 MG: 20 CAPSULE ORAL at 08:06

## 2023-06-11 RX ADMIN — SENNOSIDES AND DOCUSATE SODIUM 2 TABLET: 50; 8.6 TABLET ORAL at 08:06

## 2023-06-11 RX ADMIN — MORPHINE SULFATE 4 MG: 4 INJECTION INTRAVENOUS at 05:06

## 2023-06-11 NOTE — H&P
[facility] Hospital Medicine History & Physical Examination       Patient Name: Jessie Greene  MRN: [id number]  Patient Class: IP- Inpatient   Admission Date: 6/11/2023   Admitting Physician: LIZZETH Service   Length of Stay: 0  Attending Physician: Dr. Michael Parnell  Primary Care Provider: Bernabe Adkins Jr, MD  Face-to-Face encounter date: 06/11/2023  Code Status: Full code  Chief Complaint: Abdominal Pain (C/o left lower abd pain x 3 days worse tonight and vomiting hx of gastroparesis and colon abscess. Hx of DM )        Patient information was obtained from patient, patient's family, past medical records and ER records.     HISTORY OF PRESENT ILLNESS:   Jessie Greene is a 62 y.o. female who past medical history includes HTN, asthma, thyroid disease, DM, diverticulosis with episodes of diverticulitis, fatty liver disease, renal calculi, breast cancer, gastroparesis; presents to the ED at [facility] on 6/11/2023 with a primary complaint of left lower abdominal pain over the past 3 days.  Patient reports symptoms worse prior to arrival in the ED with associated nausea and vomiting.  Patient reports she has had episodes of diverticulitis in the past and a prior colon abscess.  She reports she took Cipro and Flagyl left over from a prior prescription which she started taking however it provided little relief.  No reports of black tarry stool, hematemesis, melena, hematochezia, bright red bleeding per rectum, chest pain, shortness the breath, headaches, or dizziness.  Patient also reported fever at home over the past day, with associated chills and vaginal pain.  She denies any urinary symptoms or dysuria.  Lab work reviewed demonstrated glucose 139; other indices unremarkable.  CT of the abdomen and pelvis with contrast impression reviewed demonstrated acute sigmoid diverticulitis without evidence of gross mural perforation or drainable collection, no findings of high-grade mechanical  small-bowel obstruction, large burden of fecal material was present throughout the course of the colon suggesting constipation.  Initial vital signs reviewed /82 pulse 80 respirations 18 temperature 99.2° F O2 saturation 94% on room air.  Patient was placed on supplemental oxygen therapy per nasal cannula at 1 L which her saturations improved to 96%.  Patient received IV hydration, Zofran, morphine, IV Cipro, IV metronidazole in the ED. patient is admitted to hospital medicine services for further management.    PAST MEDICAL HISTORY:   Dm type 2   Diverticulosis/diverticulitis   Fatty liver  Renal calculi   History of breast cancer   Gastroparesis   HTN  Asthma  Thyroid disease    PAST SURGICAL HISTORY:     Past Surgical History:   Procedure Laterality Date    BIOPSY OF THYROID      COLONOSCOPY      DILATION AND CURETTAGE OF UTERUS      HYSTERECTOMY      LIVER BIOPSY N/A     Mammogram      MASTECTOMY      THYROIDECTOMY         ALLERGIES:   Penicillins, Dilaudid [hydromorphone], and Percocet [oxycodone-acetaminophen]    FAMILY HISTORY:   Reviewed and negative    SOCIAL HISTORY:   Patient denies any tobacco use  Denies any illicit drug use  Denies any alcohol use  Lives with family     HOME MEDICATIONS:   As documented  Prior to Admission medications    Medication Sig Start Date End Date Taking? Authorizing Provider   FLUoxetine 40 MG capsule Take 40 mg by mouth 2 (two) times daily.   Yes Historical Provider   fluticasone-umeclidin-vilanter (TRELEGY ELLIPTA) 100-62.5-25 mcg DsDv Take by mouth. 8/23/21  Yes Historical Provider   JARDIANCE 25 mg tablet Take 25 mg by mouth once daily. For 30 days 6/6/23  Yes Historical Provider   losartan (COZAAR) 50 MG tablet Take 25 mg by mouth once daily. 2/22/22  Yes Historical Provider   metFORMIN (GLUCOPHAGE) 1000 MG tablet Take 1,000 mg by mouth once daily. 6/6/23  Yes Historical Provider   montelukast (SINGULAIR) 10 mg tablet Take 10 mg by mouth every other day.   Yes  Historical Provider   pioglitazone (ACTOS) 30 MG tablet Take 30 mg by mouth once daily.   Yes Historical Provider   rosuvastatin (CRESTOR) 10 MG tablet Take 10 mg by mouth once daily.   Yes Historical Provider   SOLIQUA 100/33 100 unit-33 mcg/mL InPn pen INJECT 60 UNITS SUBCUTANEOUSLY ONCE DAILY 3/19/22  Yes Historical Provider   spironolactone (ALDACTONE) 50 MG tablet Take 50 mg by mouth once daily.   Yes Historical Provider   TRADJENTA 5 mg Tab tablet Take 5 mg by mouth once daily. For 30 days 6/6/23  Yes Historical Provider   albuterol (PROVENTIL) 5 mg/mL nebulizer solution Take 2.5 mg by nebulization every 6 (six) hours as needed for Wheezing.    Historical Provider   ketoconazole (NIZORAL) 2 % cream Apply topically. 8/23/21   Historical Provider   meclizine (ANTIVERT) 25 mg tablet Take 1 tablet (25 mg total) by mouth 3 (three) times daily as needed for Dizziness.  Patient taking differently: Take 12.5 mg by mouth 3 (three) times daily as needed for Dizziness. 8/27/22   Sheryl Briggs MD   TRIJARDY XR 25-5-1,000 mg TBph Take 1 tablet by mouth once daily. 8/29/22   Historical Provider       REVIEW OF SYSTEMS:   Except as documented, all other systems reviewed and negative     PHYSICAL EXAM:     VITAL SIGNS: 24 HRS MIN & MAX LAST   Temp  Min: 99.2 °F (37.3 °C)  Max: 99.2 °F (37.3 °C) 99.2 °F (37.3 °C)   BP  Min: 103/70  Max: 132/82 103/70   Pulse  Min: 68  Max: 80  68   Resp  Min: 16  Max: 19 19   SpO2  Min: 94 %  Max: 96 % 95 %       General appearance: Well-developed, well-nourished female ,nontoxic; NAD., son at bedside  HENT: Atraumatic head. Moist mucous membranes of oral cavity.  Eyes: PERRL  Neck: Supple.   Lungs: Clear to auscultation bilaterally. No wheezing present.   Chest: bilateral mastectomy with old healed surgical incisions  Heart: Regular rate and rhythm. S1 and S2 present , cap refill brisk  Abdomen: Soft, obese, non-distended, left lower abdominal TTP, no rebound tenderness/guarding. Bowel  sounds are normal.   Extremities: No cyanosis, clubbing, [place]  Skin: warm and dry  Neuro: AAOx3, no focal deficits  Psych/mental status: Appropriate mood and affect. Responds appropriately to questions.     LABS AND IMAGING:     Recent Labs   Lab 06/11/23  0438   WBC 11.11   RBC 4.48   HGB 13.8   HCT 41.9   MCV 93.5   MCH 30.8   MCHC 32.9*   RDW 15.4      MPV 8.5       Recent Labs   Lab 06/11/23  0438      K 4.2   CO2 24   BUN 15.6   CREATININE 0.91   CALCIUM 9.8   ALBUMIN 3.7   ALKPHOS 91   ALT 27   AST 20   BILITOT 0.8       Microbiology Results (last 7 days)       ** No results found for the last 168 hours. **             CT Abdomen Pelvis With Contrast  EXAMINATION  CT ABDOMEN PELVIS WITH CONTRAST    CLINICAL HISTORY  LLQ abdominal pain;    TECHNIQUE  Post-contrast helical-acquisition CT images were obtained and multiplanar reformats accomplished by a CT technologist at a separate workstation, pushed to PACS for physician review.    CONTRAST  *IV: ISOVUE-370, 100 mL  *Enteric: none    COMPARISON  10 March 2020    FINDINGS  Images were reviewed in soft tissue, lung, and bone windows.    Exam quality: adequate for evaluation    Lines/tubes: none visualized    No acute pulmonary or cardiovascular findings appreciated.  There is no development of pericardial or pleural effusion.    Gallbladder and biliary tree are unremarkable.  Portal vein patent.  Upper abdominal solid organs are without acute or suspicious focal finding.  There is similar diffuse hypoattenuation of the liver suggestive of steatosis.  Kidneys enhance in symmetric fashion, with no evidence of distal obstructive uropathy.  The urinary bladder is unremarkable.  No suspicious adnexal lesions.  Similar changes of hysterectomy.    Esophagus and stomach are nondistended.  No findings of high-grade mechanical small bowel obstruction.  Large burden of fecal material is present throughout the course of the colon, suggesting constipation.  There  is similar scattered descending and sigmoid diverticulosis with acute changes of diverticulitis noted at the proximal sigmoid segment.  No definite organized fluid collection or gross extraluminal gas appreciated to indicate full-thickness perforation.    There is no free intra-abdominal air or fluid.  No pathologic lymph node enlargement or necrotic adenopathy is evident.    Body wall subcutaneous tissues and regional osseous structures are without of acute or destructive focal abnormality.    IMPRESSION  1. Acute sigmoid diverticulitis without evidence of gross mural perforation or drainable collection.  2. No other convincing acute abdominopelvic process.  3. Incidental findings suggestive of constipation.  4. Remaining chronic/secondary findings are similar to the March 2020 CT appearance.  ==========    This report was flagged in Epic as abnormal.    RADIATION DOSE  Automated tube current modulation, weight-based exposure dosing, and/or iterative reconstruction technique utilized to reach lowest reasonably achievable exposure rate.    DLP: 1128 mGy*cm    Electronically signed by: Kevon Perry  Date:    06/11/2023  Time:    05:46        ASSESSMENT & PLAN:   ASSESSMENT:  Acute sigmoid diverticulitis-without abscess or fluid collection- POA  Acute LLQ abdominal pain secondary to acute diverticulitis- POA  Fever- secondary to divertiulitis- POA  DM type II with hyperglycemia-POA  Nausea with vomiting- non-intractable- POA  HTN- essential  Gastroparesis- chronic likely secondary to DM - POA    Hx:  Fatty liver, breast cancer, asthma, thyroid disease    PLAN:  IV hydration  Continue with IV ciprofloxacin and metronidazole therapy   PRN pain management  Clear diet  Accu-Cheks with sliding scale  PRN antiemetic therapy  Resume home medication as deemed necessary  Repeat lab work in a.m.       VTE Prophylaxis: Lovenox for DVT prophylaxis and will be advised to be as mobile as possible     Patient condition:   Stable  __________________________________________________________________________  INPATIENT LIST OF MEDICATIONS     Scheduled Meds:   ciprofloxacin  400 mg Intravenous ED 1 Time    enoxparin  40 mg Subcutaneous Daily    metronidazole  500 mg Intravenous Q8H     Continuous Infusions:   lactated ringers       PRN Meds:.acetaminophen, dextrose 10%, dextrose 10%, glucagon (human recombinant), glucose, glucose, insulin aspart U-100, ketorolac, naloxone, ondansetron, sodium chloride 0.9%      IAlexander FNP have reviewed and discussed the case with Dr. Michael Parnell Please see the following addendum for further assessment and plan from there attending CYNTHIA Mendoza   06/11/2023

## 2023-06-11 NOTE — ED PROVIDER NOTES
Encounter Date: 6/11/2023    SCRIBE #1 NOTE: I, Sandra Vi, am scribing for, and in the presence of,  Gabi Ly MD. I have scribed the following portions of the note - Other sections scribed: HPI, ROS, PE.     History     Chief Complaint   Patient presents with    Abdominal Pain     C/o left lower abd pain x 3 days worse tonight and vomiting hx of gastroparesis and colon abscess. Hx of DM      61 y/o female with pmhx of DM, breast cancer in remission presents to ED for worsening left lower abdominal pain that exacerbates with ambulation. Pt reports she felt a cramping sensation 1 week ago that gradually worsened the next few days. Yesterday, she had a fever with associated nausea and vomit yellow liquid. She also reports vaginal pain and generalized weakness but denies dysuria. Her last bowel movement was 2 days ago. She took Flagyl and Cipro that she had at home from prior diverticulitis.     The history is provided by the patient. No  was used.   Review of patient's allergies indicates:   Allergen Reactions    Penicillins Hives    Dilaudid [hydromorphone]     Percocet [oxycodone-acetaminophen]      Past Medical History:   Diagnosis Date    Diabetes mellitus     Diverticulitis     Ectopic pregnancy     Fatty liver     Kidney stones      Past Surgical History:   Procedure Laterality Date    BIOPSY OF THYROID      COLONOSCOPY      DILATION AND CURETTAGE OF UTERUS      HYSTERECTOMY      LIVER BIOPSY N/A     Mammogram      MASTECTOMY      THYROIDECTOMY       Family History   Problem Relation Age of Onset    Cancer Mother     Hypertension Father     Diabetes Father      Social History     Tobacco Use    Smoking status: Never    Smokeless tobacco: Never   Substance Use Topics    Alcohol use: No     Review of Systems   Constitutional:  Positive for fever.        Generalized weakness.   Gastrointestinal:  Positive for abdominal pain (left lower), nausea and vomiting. Negative for diarrhea.    Genitourinary:  Positive for vaginal pain. Negative for dysuria, menstrual problem and vaginal bleeding.     Physical Exam     Initial Vitals [06/11/23 0410]   BP Pulse Resp Temp SpO2   132/82 80 18 99.2 °F (37.3 °C) (!) 94 %      MAP       --         Physical Exam    Nursing note and vitals reviewed.  Constitutional: She appears well-developed and well-nourished. She is not diaphoretic. She does not appear ill. No distress.   HENT:   Head: Normocephalic and atraumatic.   Right Ear: External ear normal.   Left Ear: External ear normal.   Mouth/Throat: Oropharynx is clear and moist.   Eyes: Conjunctivae are normal.   Neck: Neck supple. No tracheal deviation present.   Cardiovascular:  Normal rate, regular rhythm and normal heart sounds.           No murmur heard.  Pulmonary/Chest: Breath sounds normal. No respiratory distress. She has no wheezes. She has no rhonchi. She has no rales.   Abdominal: Abdomen is soft. She exhibits no distension. There is abdominal tenderness.   Bilateral lower abdominal tenderness.   No right CVA tenderness.  No left CVA tenderness. There is no rebound and no guarding.   Musculoskeletal:         General: Normal range of motion.      Cervical back: Neck supple.     Neurological: She is alert and oriented to person, place, and time. GCS score is 15. GCS eye subscore is 4. GCS verbal subscore is 5. GCS motor subscore is 6.   Skin: Skin is warm and dry. Capillary refill takes less than 2 seconds. No rash noted. No pallor.       ED Course   Procedures  Labs Reviewed   COMPREHENSIVE METABOLIC PANEL - Abnormal; Notable for the following components:       Result Value    Glucose Level 139 (*)     Protein Total 7.7 (*)     Globulin 4.0 (*)     Albumin/Globulin Ratio 0.9 (*)     All other components within normal limits   CBC WITH DIFFERENTIAL - Abnormal; Notable for the following components:    MCHC 32.9 (*)     IG# 0.05 (*)     All other components within normal limits   LIPASE - Normal   CBC  W/ AUTO DIFFERENTIAL    Narrative:     The following orders were created for panel order CBC W/ AUTO DIFFERENTIAL.  Procedure                               Abnormality         Status                     ---------                               -----------         ------                     CBC with Differential[[id number]]        Abnormal            Final result                 Please view results for these tests on the individual orders.   URINALYSIS, REFLEX TO URINE CULTURE   ISTAT CHEM8          Imaging Results               CT Abdomen Pelvis With Contrast (Final result)  Result time 06/11/23 05:46:56      Final result by Kevon Perry MD (06/11/23 05:46:56)                   Narrative:    EXAMINATION  CT ABDOMEN PELVIS WITH CONTRAST    CLINICAL HISTORY  LLQ abdominal pain;    TECHNIQUE  Post-contrast helical-acquisition CT images were obtained and multiplanar reformats accomplished by a CT technologist at a separate workstation, pushed to PACS for physician review.    CONTRAST  *IV: ISOVUE-370, 100 mL  *Enteric: none    COMPARISON  10 March 2020    FINDINGS  Images were reviewed in soft tissue, lung, and bone windows.    Exam quality: adequate for evaluation    Lines/tubes: none visualized    No acute pulmonary or cardiovascular findings appreciated.  There is no development of pericardial or pleural effusion.    Gallbladder and biliary tree are unremarkable.  Portal vein patent.  Upper abdominal solid organs are without acute or suspicious focal finding.  There is similar diffuse hypoattenuation of the liver suggestive of steatosis.  Kidneys enhance in symmetric fashion, with no evidence of distal obstructive uropathy.  The urinary bladder is unremarkable.  No suspicious adnexal lesions.  Similar changes of hysterectomy.    Esophagus and stomach are nondistended.  No findings of high-grade mechanical small bowel obstruction.  Large burden of fecal material is present throughout the course of the colon,  suggesting constipation.  There is similar scattered descending and sigmoid diverticulosis with acute changes of diverticulitis noted at the proximal sigmoid segment.  No definite organized fluid collection or gross extraluminal gas appreciated to indicate full-thickness perforation.    There is no free intra-abdominal air or fluid.  No pathologic lymph node enlargement or necrotic adenopathy is evident.    Body wall subcutaneous tissues and regional osseous structures are without of acute or destructive focal abnormality.    IMPRESSION  1. Acute sigmoid diverticulitis without evidence of gross mural perforation or drainable collection.  2. No other convincing acute abdominopelvic process.  3. Incidental findings suggestive of constipation.  4. Remaining chronic/secondary findings are similar to the March 2020 CT appearance.  ==========    This report was flagged in Epic as abnormal.    RADIATION DOSE  Automated tube current modulation, weight-based exposure dosing, and/or iterative reconstruction technique utilized to reach lowest reasonably achievable exposure rate.    DLP: 1128 mGy*cm      Electronically signed by: Kevon Perry  Date:    06/11/2023  Time:    05:46                                     Medications   ciprofloxacin (CIPRO)400mg/200ml D5W IVPB 400 mg (has no administration in time range)   metronidazole IVPB 500 mg (0 mg Intravenous Stopped 6/11/23 0745)   morphine injection 4 mg (4 mg Intravenous Given 6/11/23 0516)   ondansetron injection 4 mg (4 mg Intravenous Given 6/11/23 0516)   sodium chloride 0.9% bolus 3,171 mL 3,171 mL (3,171 mLs Intravenous New Bag 6/11/23 0516)   iopamidoL (ISOVUE-370) injection 100 mL (100 mLs Intravenous Given 6/11/23 0537)              Scribe Attestation:   Scribe #1: I performed the above scribed service and the documentation accurately describes the services I performed. I attest to the accuracy of the note.    Attending Attestation:           Physician Attestation for  Scribe:  Physician Attestation Statement for Scribe #1: I, Gabi Ly MD, reviewed documentation, as scribed by Sandra Oshea in my presence, and it is both accurate and complete.       Medical Decision Making  63 yo female with LLQ pain     The differential diagnosis includes, but is not limited to: Diverticulitis, Appendicitis, UTI, and Colitis.     Labs normal  Morphine, Zofran, IVF   CT with sigmoid diverticulitis   Cipro and Flagyl IV  Discussed outpatient vs inpatient management with patient and she thinks her pain is too bad to go home so will admit  Spoke with hospitalist for admission     Problems Addressed:  Acute left lower quadrant pain: acute illness or injury that poses a threat to life or bodily functions  Sigmoid diverticulitis: acute illness or injury that poses a threat to life or bodily functions    Amount and/or Complexity of Data Reviewed  Labs: ordered. Decision-making details documented in ED Course.  Radiology: ordered. Decision-making details documented in ED Course.    Risk  Prescription drug management.  Parenteral controlled substances.  Decision regarding hospitalization.                         Clinical Impression:   Final diagnoses:  [K57.32] Sigmoid diverticulitis (Primary)  [R10.32] Acute left lower quadrant pain        ED Disposition Condition    Admit Stable                Gabi Ly MD  06/11/23 4707

## 2023-06-12 LAB
ALBUMIN SERPL-MCNC: 3.1 G/DL (ref 3.4–4.8)
ALBUMIN/GLOB SERPL: 1.1 RATIO (ref 1.1–2)
ALP SERPL-CCNC: 74 UNIT/L (ref 40–150)
ALT SERPL-CCNC: 21 UNIT/L (ref 0–55)
AST SERPL-CCNC: 16 UNIT/L (ref 5–34)
BASOPHILS # BLD AUTO: 0.02 X10(3)/MCL
BASOPHILS NFR BLD AUTO: 0.3 %
BILIRUBIN DIRECT+TOT PNL SERPL-MCNC: 0.6 MG/DL
BUN SERPL-MCNC: 14.7 MG/DL (ref 9.8–20.1)
CALCIUM SERPL-MCNC: 8.6 MG/DL (ref 8.4–10.2)
CHLORIDE SERPL-SCNC: 105 MMOL/L (ref 98–107)
CO2 SERPL-SCNC: 24 MMOL/L (ref 23–31)
CREAT SERPL-MCNC: 0.78 MG/DL (ref 0.55–1.02)
EOSINOPHIL # BLD AUTO: 0.33 X10(3)/MCL (ref 0–0.9)
EOSINOPHIL NFR BLD AUTO: 4.3 %
ERYTHROCYTE [DISTWIDTH] IN BLOOD BY AUTOMATED COUNT: 15.3 % (ref 11.5–17)
GFR SERPLBLD CREATININE-BSD FMLA CKD-EPI: >60 MLS/MIN/1.73/M2
GLOBULIN SER-MCNC: 2.7 GM/DL (ref 2.4–3.5)
GLUCOSE SERPL-MCNC: 100 MG/DL (ref 70–110)
GLUCOSE SERPL-MCNC: 100 MG/DL (ref 82–115)
GLUCOSE SERPL-MCNC: 142 MG/DL (ref 70–110)
HCT VFR BLD AUTO: 36.1 % (ref 37–47)
HGB BLD-MCNC: 11.4 G/DL (ref 12–16)
IMM GRANULOCYTES # BLD AUTO: 0.04 X10(3)/MCL (ref 0–0.04)
IMM GRANULOCYTES NFR BLD AUTO: 0.5 %
LYMPHOCYTES # BLD AUTO: 1.8 X10(3)/MCL (ref 0.6–4.6)
LYMPHOCYTES NFR BLD AUTO: 23.7 %
MCH RBC QN AUTO: 30.1 PG (ref 27–31)
MCHC RBC AUTO-ENTMCNC: 31.6 G/DL (ref 33–36)
MCV RBC AUTO: 95.3 FL (ref 80–94)
MONOCYTES # BLD AUTO: 0.6 X10(3)/MCL (ref 0.1–1.3)
MONOCYTES NFR BLD AUTO: 7.9 %
NEUTROPHILS # BLD AUTO: 4.81 X10(3)/MCL (ref 2.1–9.2)
NEUTROPHILS NFR BLD AUTO: 63.3 %
NRBC BLD AUTO-RTO: 0 %
PLATELET # BLD AUTO: 269 X10(3)/MCL (ref 130–400)
PMV BLD AUTO: 8.5 FL (ref 7.4–10.4)
POCT GLUCOSE: 103 MG/DL (ref 70–110)
POCT GLUCOSE: 110 MG/DL (ref 70–110)
POCT GLUCOSE: 142 MG/DL (ref 70–110)
POTASSIUM SERPL-SCNC: 3.7 MMOL/L (ref 3.5–5.1)
PROT SERPL-MCNC: 5.8 GM/DL (ref 5.8–7.6)
RBC # BLD AUTO: 3.79 X10(6)/MCL (ref 4.2–5.4)
SODIUM SERPL-SCNC: 134 MMOL/L (ref 136–145)
WBC # SPEC AUTO: 7.6 X10(3)/MCL (ref 4.5–11.5)

## 2023-06-12 PROCEDURE — 94640 AIRWAY INHALATION TREATMENT: CPT

## 2023-06-12 PROCEDURE — 96366 THER/PROPH/DIAG IV INF ADDON: CPT

## 2023-06-12 PROCEDURE — 25000242 PHARM REV CODE 250 ALT 637 W/ HCPCS: Performed by: INTERNAL MEDICINE

## 2023-06-12 PROCEDURE — 85025 COMPLETE CBC W/AUTO DIFF WBC: CPT | Performed by: NURSE PRACTITIONER

## 2023-06-12 PROCEDURE — 25000003 PHARM REV CODE 250: Performed by: EMERGENCY MEDICINE

## 2023-06-12 PROCEDURE — 25000003 PHARM REV CODE 250: Performed by: INTERNAL MEDICINE

## 2023-06-12 PROCEDURE — 63600175 PHARM REV CODE 636 W HCPCS: Performed by: NURSE PRACTITIONER

## 2023-06-12 PROCEDURE — G0378 HOSPITAL OBSERVATION PER HR: HCPCS

## 2023-06-12 PROCEDURE — 96372 THER/PROPH/DIAG INJ SC/IM: CPT | Performed by: NURSE PRACTITIONER

## 2023-06-12 PROCEDURE — S0030 INJECTION, METRONIDAZOLE: HCPCS | Performed by: EMERGENCY MEDICINE

## 2023-06-12 PROCEDURE — 80053 COMPREHEN METABOLIC PANEL: CPT | Performed by: NURSE PRACTITIONER

## 2023-06-12 PROCEDURE — 94761 N-INVAS EAR/PLS OXIMETRY MLT: CPT

## 2023-06-12 PROCEDURE — 25000003 PHARM REV CODE 250: Performed by: NURSE PRACTITIONER

## 2023-06-12 RX ORDER — ALBUTEROL SULFATE 0.83 MG/ML
2.5 SOLUTION RESPIRATORY (INHALATION) EVERY 6 HOURS PRN
Status: DISCONTINUED | OUTPATIENT
Start: 2023-06-12 | End: 2023-06-13 | Stop reason: HOSPADM

## 2023-06-12 RX ORDER — FLUTICASONE FUROATE AND VILANTEROL 100; 25 UG/1; UG/1
1 POWDER RESPIRATORY (INHALATION) DAILY
Status: DISCONTINUED | OUTPATIENT
Start: 2023-06-12 | End: 2023-06-13 | Stop reason: HOSPADM

## 2023-06-12 RX ADMIN — FLUOXETINE 40 MG: 20 CAPSULE ORAL at 10:06

## 2023-06-12 RX ADMIN — KETOROLAC TROMETHAMINE 15 MG: 30 INJECTION, SOLUTION INTRAMUSCULAR; INTRAVENOUS at 11:06

## 2023-06-12 RX ADMIN — ENOXAPARIN SODIUM 40 MG: 40 INJECTION SUBCUTANEOUS at 05:06

## 2023-06-12 RX ADMIN — SPIRONOLACTONE 50 MG: 25 TABLET ORAL at 10:06

## 2023-06-12 RX ADMIN — METRONIDAZOLE 500 MG: 500 INJECTION, SOLUTION INTRAVENOUS at 02:06

## 2023-06-12 RX ADMIN — CIPROFLOXACIN 400 MG: 2 INJECTION, SOLUTION INTRAVENOUS at 05:06

## 2023-06-12 RX ADMIN — METRONIDAZOLE 500 MG: 500 INJECTION, SOLUTION INTRAVENOUS at 07:06

## 2023-06-12 RX ADMIN — ALBUTEROL SULFATE 2.5 MG: 2.5 SOLUTION RESPIRATORY (INHALATION) at 07:06

## 2023-06-12 RX ADMIN — ATORVASTATIN CALCIUM 10 MG: 10 TABLET, FILM COATED ORAL at 10:06

## 2023-06-12 RX ADMIN — SITAGLIPTIN 100 MG: 25 TABLET, FILM COATED ORAL at 10:06

## 2023-06-12 RX ADMIN — PIOGLITAZONE 30 MG: 15 TABLET ORAL at 10:06

## 2023-06-12 RX ADMIN — FLUOXETINE 40 MG: 20 CAPSULE ORAL at 08:06

## 2023-06-12 RX ADMIN — SENNOSIDES AND DOCUSATE SODIUM 2 TABLET: 50; 8.6 TABLET ORAL at 08:06

## 2023-06-12 RX ADMIN — SENNOSIDES AND DOCUSATE SODIUM 2 TABLET: 50; 8.6 TABLET ORAL at 10:06

## 2023-06-12 RX ADMIN — ACETAMINOPHEN 325MG 650 MG: 325 TABLET ORAL at 05:06

## 2023-06-12 RX ADMIN — METRONIDAZOLE 500 MG: 500 INJECTION, SOLUTION INTRAVENOUS at 10:06

## 2023-06-12 RX ADMIN — FLUTICASONE FUROATE AND VILANTEROL TRIFENATATE 1 PUFF: 100; 25 POWDER RESPIRATORY (INHALATION) at 10:06

## 2023-06-12 NOTE — PLAN OF CARE
06/12/23 0915   Discharge Assessment   Assessment Type Discharge Planning Assessment   Confirmed/corrected address, phone number and insurance Yes   Confirmed Demographics Correct on Facesheet   Source of Information patient   When was your last doctors appointment?   (Patient reports 1 month ago)   Communicated JOSE MARTIN with patient/caregiver Yes   Reason For Admission Sigmoid Diverticulitis   People in Home alone   Do you expect to return to your current living situation? Yes   Do you have help at home or someone to help you manage your care at home? Yes   Who are your caregiver(s) and their phone number(s)? Son: Gaetano Berkowitzen: [phone]   Prior to hospitilization cognitive status: Unable to Assess   Current cognitive status: Alert/Oriented   Home Accessibility wheelchair accessible   Home Layout Able to live on 1st floor   Equipment Currently Used at Home CPAP;cane, straight;walker, rolling   Readmission within 30 days? No   Patient currently being followed by outpatient case management? No   Do you currently have service(s) that help you manage your care at home? No   Do you take prescription medications? Yes   Do you have prescription coverage? Yes   Coverage [employer]   Do you have any problems affording any of your prescribed medications? No   Is the patient taking medications as prescribed? yes   Who is going to help you get home at discharge? Patient reports self.   How do you get to doctors appointments? car, drives self   Are you on dialysis? No   Do you take coumadin? No   Discharge Plan A Home   Discharge Plan B Home with family   DME Needed Upon Discharge  none   Discharge Plan discussed with: Patient   Transition of Care Barriers None       Patient's PCP is Dr. Bernabe Adkins.  No barriers to discharge at this time.

## 2023-06-12 NOTE — NURSING
Nurses Note -- 4 Eyes      6/12/2023   11:26 AM      Skin assessed during: Admit      [x] No Altered Skin Integrity Present    [x]Prevention Measures Documented      [] Yes- Altered Skin Integrity Present or Discovered   [] LDA Added if Not in Epic (Describe Wound)   [] New Altered Skin Integrity was Present on Admit and Documented in LDA   [] Wound Image Taken    Wound Care Consulted? No    Attending Nurse:  Osbaldo Bernal RN     Second RN/Staff Member:  Anna Sheth RN

## 2023-06-12 NOTE — PROGRESS NOTES
Hospital Medicine  Progress Note    Patient Name: Jessie Greene  MRN: [id number]  Status: IP- Inpatient   Admission Date: 6/11/2023  Length of Stay: 1  Date of Service: 06/12/2023       CC: hospital follow-up for diverticulitis       SUBJECTIVE   62 y.o. female  with a history of prioor episodes of diverticulitis, presented to the ED 6/11 with left lower abdominal pain x 3 days, associated with nausea and vomiting.  Patient reports she has had episodes of diverticulitis in the past and a prior colon abscess.  She reports she took Cipro and Flagyl left over from a prior prescription, but it provided little relief.  Lab work grossly unremarkable, but CT abdomen/pelvis demonstrated acute sigmoid diverticulitis without evidence of gross mural perforation or drainable collection; large burden of fecal material was present throughout the course of the colon suggesting constipation.  Patient was admitted to [facility] and started on IV Cipro/Flagyl in the ED. patient is admitted to hospital medicine services for further management.    Today: Patient seen and examined at bedside, and chart reviewed.  Reports improvement in pain.  Tolerating diet.      MEDICATIONS   Scheduled   atorvastatin  10 mg Oral Daily    ciprofloxacin  400 mg Intravenous Q12H    enoxparin  40 mg Subcutaneous Daily    FLUoxetine  40 mg Oral BID    fluticasone furoate-vilanteroL  1 puff Inhalation Daily    losartan  25 mg Oral Daily    metronidazole  500 mg Intravenous Q8H    pioglitazone  30 mg Oral Daily    senna-docusate 8.6-50 mg  2 tablet Oral BID    SITagliptin phosphate  100 mg Oral Daily    spironolactone  50 mg Oral Daily     Continuous Infusions   lactated ringers 75 mL/hr at 06/12/23 0624         PHYSICAL EXAM   VITALS: T 98.2 °F (36.8 °C)   /71   P 60   RR 19   O2 95 %    GENERAL: Awake and in NAD  LUNGS: CTA B/L  CVS: Normal rate  GI/: Soft, minimally TTP LLQ, bowel sounds positive.  EXTREMITIES: No peripheral edema  NEURO:  AAOx3  PSYCH: Cooperative      LABS   CBC  Recent Labs     06/11/23  0438 06/12/23  0331   WBC 11.11 7.60   RBC 4.48 3.79*   HGB 13.8 11.4*   HCT 41.9 36.1*   MCV 93.5 95.3*   MCH 30.8 30.1   MCHC 32.9* 31.6*   RDW 15.4 15.3    269     CHEM  Recent Labs     06/11/23  0438 06/12/23  0331    134*   K 4.2 3.7   CHLORIDE 105 105   CO2 24 24   BUN 15.6 14.7   CREATININE 0.91 0.78   GLUCOSE 139* 100   CALCIUM 9.8 8.6   ALBUMIN 3.7 3.1*   GLOBULIN 4.0* 2.7   ALKPHOS 91 74   ALT 27 21   AST 20 16   BILITOT 0.8 0.6   LIPASE 23  --        ASSESSMENT   Acute sigmoid diverticulitis  NIDDM II  Essential HTN  h/o diabetic Gastroparesis    PLAN   Continue with Cipro/Flagyl and gentle IV hydration  Will advance diet to soft as tolerated  If tolerating diet, possible D/C in AM  Home medications reviewed and resumed as deemed appropriate  Otherwise continue current management and monitoring.      Prophylaxis: SC Lovenox        Ken Walsh MD  [facility] Medicine

## 2023-06-12 NOTE — PLAN OF CARE
Problem: Adult Inpatient Plan of Care  Goal: Plan of Care Review  Outcome: Ongoing, Progressing  Flowsheets (Taken 6/12/2023 1314)  Plan of Care Reviewed With: patient  Goal: Patient-Specific Goal (Individualized)  Outcome: Ongoing, Progressing  Goal: Absence of Hospital-Acquired Illness or Injury  Outcome: Ongoing, Progressing  Intervention: Identify and Manage Fall Risk  Flowsheets (Taken 6/12/2023 1314)  Safety Promotion/Fall Prevention:   assistive device/personal item within reach   medications reviewed   nonskid shoes/socks when out of bed   side rails raised x 2  Intervention: Prevent Skin Injury  Flowsheets (Taken 6/12/2023 1314)  Body Position: position changed independently  Skin Protection:   adhesive use limited   incontinence pads utilized   tubing/devices free from skin contact  Intervention: Prevent and Manage VTE (Venous Thromboembolism) Risk  Flowsheets (Taken 6/12/2023 1314)  Activity Management: Ambulated to bathroom - L4  VTE Prevention/Management:   ambulation promoted   bleeding risk assessed   ROM (active) performed  Range of Motion:   active ROM (range of motion) encouraged   ROM (range of motion) performed  Intervention: Prevent Infection  Flowsheets (Taken 6/12/2023 1314)  Infection Prevention:   rest/sleep promoted   single patient room provided  Goal: Optimal Comfort and Wellbeing  Outcome: Ongoing, Progressing  Intervention: Monitor Pain and Promote Comfort  Flowsheets (Taken 6/12/2023 1314)  Pain Management Interventions:   care clustered   medication offered   pain management plan reviewed with patient/caregiver   pillow support provided   position adjusted  Intervention: Provide Person-Centered Care  Flowsheets (Taken 6/12/2023 1314)  Trust Relationship/Rapport: care explained  Goal: Readiness for Transition of Care  Outcome: Ongoing, Progressing     Problem: Diabetes Comorbidity  Goal: Blood Glucose Level Within Targeted Range  Outcome: Ongoing, Progressing  Intervention: Monitor  and Manage Glycemia  Flowsheets (Taken 6/12/2023 1314)  Glycemic Management: blood glucose monitored

## 2023-06-12 NOTE — PLAN OF CARE
Problem: Adult Inpatient Plan of Care  Goal: Plan of Care Review  Outcome: Ongoing, Progressing  Flowsheets (Taken 6/12/2023 0224)  Plan of Care Reviewed With: patient  Goal: Patient-Specific Goal (Individualized)  Outcome: Ongoing, Progressing  Flowsheets (Taken 6/12/2023 0224)  Individualized Care Needs: Monitor labs, manage pain. IV fluids, IV antibiotics.  Goal: Absence of Hospital-Acquired Illness or Injury  Outcome: Ongoing, Progressing  Intervention: Identify and Manage Fall Risk  Flowsheets (Taken 6/12/2023 0224)  Safety Promotion/Fall Prevention:   assistive device/personal item within reach   nonskid shoes/socks when out of bed   medications reviewed  Intervention: Prevent Skin Injury  Flowsheets (Taken 6/12/2023 0224)  Body Position:   position changed independently   neutral body alignment   neutral head position  Skin Protection:   adhesive use limited   tubing/devices free from skin contact  Intervention: Prevent and Manage VTE (Venous Thromboembolism) Risk  Flowsheets (Taken 6/12/2023 0224)  Activity Management:   Arm raise - L1   Rolling - L1  VTE Prevention/Management:   ROM (active) performed   ROM (passive) performed   bleeding risk assessed   ambulation promoted   dorsiflexion/plantar flexion performed   fluids promoted   intravenous hydration  Range of Motion:   active ROM (range of motion) encouraged   ROM (range of motion) performed  Intervention: Prevent Infection  Flowsheets (Taken 6/12/2023 0224)  Infection Prevention:   single patient room provided   rest/sleep promoted   hand hygiene promoted  Goal: Optimal Comfort and Wellbeing  Outcome: Ongoing, Progressing  Intervention: Monitor Pain and Promote Comfort  Flowsheets (Taken 6/12/2023 0224)  Pain Management Interventions:   pain management plan reviewed with patient/caregiver   medication offered   care clustered  Intervention: Provide Person-Centered Care  Flowsheets (Taken 6/12/2023 0224)  Trust Relationship/Rapport:   care  explained   questions encouraged   choices provided   reassurance provided   emotional support provided   thoughts/feelings acknowledged   empathic listening provided   questions answered  Goal: Readiness for Transition of Care  Outcome: Ongoing, Progressing

## 2023-06-13 VITALS
DIASTOLIC BLOOD PRESSURE: 74 MMHG | TEMPERATURE: 99 F | RESPIRATION RATE: 20 BRPM | WEIGHT: 233 LBS | HEART RATE: 88 BPM | HEIGHT: 69 IN | OXYGEN SATURATION: 98 % | BODY MASS INDEX: 34.51 KG/M2 | SYSTOLIC BLOOD PRESSURE: 117 MMHG

## 2023-06-13 LAB
ANION GAP SERPL CALC-SCNC: 8 MEQ/L
BUN SERPL-MCNC: 11 MG/DL (ref 9.8–20.1)
CALCIUM SERPL-MCNC: 8.9 MG/DL (ref 8.4–10.2)
CHLORIDE SERPL-SCNC: 110 MMOL/L (ref 98–107)
CO2 SERPL-SCNC: 25 MMOL/L (ref 23–31)
CREAT SERPL-MCNC: 0.75 MG/DL (ref 0.55–1.02)
CREAT/UREA NIT SERPL: 15
GFR SERPLBLD CREATININE-BSD FMLA CKD-EPI: >60 MLS/MIN/1.73/M2
GLUCOSE SERPL-MCNC: 101 MG/DL (ref 82–115)
MAGNESIUM SERPL-MCNC: 1.7 MG/DL (ref 1.6–2.6)
POCT GLUCOSE: 144 MG/DL (ref 70–110)
POTASSIUM SERPL-SCNC: 3.9 MMOL/L (ref 3.5–5.1)
SODIUM SERPL-SCNC: 143 MMOL/L (ref 136–145)

## 2023-06-13 PROCEDURE — S0030 INJECTION, METRONIDAZOLE: HCPCS | Performed by: EMERGENCY MEDICINE

## 2023-06-13 PROCEDURE — 25000003 PHARM REV CODE 250: Performed by: INTERNAL MEDICINE

## 2023-06-13 PROCEDURE — 94761 N-INVAS EAR/PLS OXIMETRY MLT: CPT

## 2023-06-13 PROCEDURE — 25000242 PHARM REV CODE 250 ALT 637 W/ HCPCS: Performed by: INTERNAL MEDICINE

## 2023-06-13 PROCEDURE — 25000003 PHARM REV CODE 250: Performed by: EMERGENCY MEDICINE

## 2023-06-13 PROCEDURE — 96366 THER/PROPH/DIAG IV INF ADDON: CPT

## 2023-06-13 PROCEDURE — 80048 BASIC METABOLIC PNL TOTAL CA: CPT | Performed by: INTERNAL MEDICINE

## 2023-06-13 PROCEDURE — 83735 ASSAY OF MAGNESIUM: CPT | Performed by: INTERNAL MEDICINE

## 2023-06-13 PROCEDURE — 25000003 PHARM REV CODE 250: Performed by: NURSE PRACTITIONER

## 2023-06-13 PROCEDURE — 63600175 PHARM REV CODE 636 W HCPCS: Performed by: NURSE PRACTITIONER

## 2023-06-13 PROCEDURE — G0378 HOSPITAL OBSERVATION PER HR: HCPCS

## 2023-06-13 RX ORDER — DOCUSATE SODIUM 100 MG/1
100 CAPSULE, LIQUID FILLED ORAL 2 TIMES DAILY
Status: DISCONTINUED | OUTPATIENT
Start: 2023-06-13 | End: 2023-06-13

## 2023-06-13 RX ORDER — METRONIDAZOLE 500 MG/1
500 TABLET ORAL EVERY 8 HOURS
Qty: 15 TABLET | Refills: 0 | Status: SHIPPED | OUTPATIENT
Start: 2023-06-13 | End: 2023-06-18

## 2023-06-13 RX ORDER — KETOROLAC TROMETHAMINE 10 MG/1
10 TABLET, FILM COATED ORAL EVERY 8 HOURS PRN
Qty: 15 TABLET | Refills: 0 | Status: SHIPPED | OUTPATIENT
Start: 2023-06-13 | End: 2023-06-18

## 2023-06-13 RX ORDER — POLYETHYLENE GLYCOL 3350 17 G/17G
17 POWDER, FOR SOLUTION ORAL DAILY PRN
Status: DISCONTINUED | OUTPATIENT
Start: 2023-06-13 | End: 2023-06-13 | Stop reason: HOSPADM

## 2023-06-13 RX ORDER — CIPROFLOXACIN 500 MG/1
500 TABLET ORAL EVERY 12 HOURS
Qty: 10 TABLET | Refills: 0 | Status: SHIPPED | OUTPATIENT
Start: 2023-06-13 | End: 2023-06-18

## 2023-06-13 RX ADMIN — FLUTICASONE FUROATE AND VILANTEROL TRIFENATATE 1 PUFF: 100; 25 POWDER RESPIRATORY (INHALATION) at 09:06

## 2023-06-13 RX ADMIN — CIPROFLOXACIN 400 MG: 2 INJECTION, SOLUTION INTRAVENOUS at 05:06

## 2023-06-13 RX ADMIN — SENNOSIDES AND DOCUSATE SODIUM 2 TABLET: 50; 8.6 TABLET ORAL at 09:06

## 2023-06-13 RX ADMIN — SPIRONOLACTONE 50 MG: 25 TABLET ORAL at 09:06

## 2023-06-13 RX ADMIN — METRONIDAZOLE 500 MG: 500 INJECTION, SOLUTION INTRAVENOUS at 06:06

## 2023-06-13 RX ADMIN — ATORVASTATIN CALCIUM 10 MG: 10 TABLET, FILM COATED ORAL at 09:06

## 2023-06-13 RX ADMIN — SITAGLIPTIN 100 MG: 25 TABLET, FILM COATED ORAL at 09:06

## 2023-06-13 RX ADMIN — POLYETHYLENE GLYCOL 3350 17 G: 17 POWDER, FOR SOLUTION ORAL at 09:06

## 2023-06-13 RX ADMIN — FLUOXETINE 40 MG: 20 CAPSULE ORAL at 09:06

## 2023-06-13 RX ADMIN — PIOGLITAZONE 30 MG: 15 TABLET ORAL at 09:06

## 2023-06-13 NOTE — PLAN OF CARE
Problem: Adult Inpatient Plan of Care  Goal: Plan of Care Review  Outcome: Ongoing, Progressing  Flowsheets (Taken 6/12/2023 2259)  Plan of Care Reviewed With: patient  Goal: Patient-Specific Goal (Individualized)  Outcome: Ongoing, Progressing  Goal: Absence of Hospital-Acquired Illness or Injury  Outcome: Ongoing, Progressing  Intervention: Identify and Manage Fall Risk  Flowsheets (Taken 6/12/2023 2259)  Safety Promotion/Fall Prevention:   assistive device/personal item within reach   lighting adjusted   instructed to call staff for mobility  Intervention: Prevent Skin Injury  Flowsheets (Taken 6/12/2023 2259)  Body Position: position changed independently  Skin Protection: tubing/devices free from skin contact  Intervention: Prevent and Manage VTE (Venous Thromboembolism) Risk  Flowsheets (Taken 6/12/2023 2259)  Activity Management: Ambulated to bathroom - L4  VTE Prevention/Management: dorsiflexion/plantar flexion performed  Range of Motion:   active ROM (range of motion) encouraged   ROM (range of motion) performed  Intervention: Prevent Infection  Flowsheets (Taken 6/12/2023 2259)  Infection Prevention:   equipment surfaces disinfected   hand hygiene promoted   personal protective equipment utilized   visitors restricted/screened   single patient room provided   rest/sleep promoted  Goal: Optimal Comfort and Wellbeing  Outcome: Ongoing, Progressing  Intervention: Monitor Pain and Promote Comfort  Flowsheets (Taken 6/12/2023 2259)  Pain Management Interventions:   care clustered   medication offered   pillow support provided   relaxation techniques promoted   quiet environment facilitated  Intervention: Provide Person-Centered Care  Flowsheets (Taken 6/12/2023 2259)  Trust Relationship/Rapport:   care explained   questions encouraged   reassurance provided   thoughts/feelings acknowledged  Goal: Readiness for Transition of Care  Outcome: Ongoing, Progressing     Problem: Diabetes Comorbidity  Goal: Blood  Glucose Level Within Targeted Range  Outcome: Ongoing, Progressing  Intervention: Monitor and Manage Glycemia  Flowsheets (Taken 6/12/2023 8680)  Glycemic Management: blood glucose monitored

## 2023-06-13 NOTE — DISCHARGE INSTRUCTIONS
Please return to the ER with any worsening of your symptoms. Call your PCP with any further questions or concerns. Attend all scheduled follow up appointments.

## 2023-06-14 NOTE — DISCHARGE SUMMARY
Hospital Medicine  Discharge Summary    Patient Name: Jessie Greene  MRN: [id number]  Admit Date: 6/11/2023  Discharge Date: 6/13/2023   Status: OP- Observation   Length of Stay: 1      PHYSICIANS   Admitting Physician: Michael Parnell MD  Discharging Physician: Ken Walsh MD.  Primary Care Physician: Bernabe Partida Jr, MD  Consults: None      DISCHARGE DIAGNOSES   Acute sigmoid diverticulitis  NIDDM II  Essential HTN  h/o diabetic Gastroparesis      PROCEDURES   None      HOSPITAL COURSE    62 y.o. female  with a history of prioor episodes of diverticulitis, presented to the ED 6/11 with left lower abdominal pain x 3 days, associated with nausea and vomiting.  Patient reports she has had episodes of diverticulitis in the past and a prior colon abscess.  She reports she took Cipro and Flagyl left over from a prior prescription, but it provided little relief.  Lab work grossly unremarkable, but CT abdomen/pelvis demonstrated acute sigmoid diverticulitis without evidence of gross mural perforation or drainable collection; large burden of fecal material was present throughout the course of the colon suggesting constipation.  Patient was admitted to [facility] and started on IV Cipro/Flagyl in the ED. patient is admitted to hospital medicine services for further management.  Continued on Cipro/Flagyl.  She was improving and tolerating diet, advanced up to soft.  She is stable for discharge to complete a course of cipro/flagyl. She was also started on a bowel regimen for constipation.      STATUS  Improved    DISPOSITION  Discharge to home    DIET  Diabetic    ACTIVITY  As tolerated      FOLLOW-UP      Bernabe Partida Jr, MD Follow up on 6/15/2023.    Specialty: Internal Medicine  Why: FOLLOW UP WITH DR PARTIDA BONIFACIO 15 @ 1:00 PM  Contact information:  [address] [place] [address]  [address] [place] [id number]  [phone]                 DISCHARGE MEDICATION RECONCILIATION     PRESCRIBED     ciprofloxacin HCl 500 MG  tablet  Commonly known as: CIPRO  Take 1 tablet (500 mg total) by mouth every 12 (twelve) hours. for 5 days     ketorolac 10 mg tablet  Commonly known as: TORADOL  Take 1 tablet (10 mg total) by mouth every 8 (eight) hours as needed for Pain.     metroNIDAZOLE 500 MG tablet  Commonly known as: FLAGYL  Take 1 tablet (500 mg total) by mouth every 8 (eight) hours. for 5 days            CONTINUE with CHANGES     meclizine 25 mg tablet  Commonly known as: ANTIVERT  Take 1 tablet (25 mg total) by mouth 3 (three) times daily as needed for Dizziness.  What changed: how much to take            CONTINUE     albuterol 5 mg/mL nebulizer solution  Commonly known as: PROVENTIL     FLUoxetine 40 MG capsule     fluticasone-umeclidin-vilanter 100-62.5-25 mcg Dsdv  Commonly known as: TRELEGY ELLIPTA     JARDIANCE 25 mg tablet  Generic drug: empagliflozin     ketoconazole 2 % cream  Commonly known as: NIZORAL     losartan 50 MG tablet  Commonly known as: COZAAR     metFORMIN 1000 MG tablet  Commonly known as: GLUCOPHAGE     montelukast 10 mg tablet  Commonly known as: SINGULAIR     pioglitazone 30 MG tablet  Commonly known as: ACTOS     rosuvastatin 10 MG tablet  Commonly known as: CRESTOR     SOLIQUA 100/33 100 unit-33 mcg/mL Inpn pen  Generic drug: insulin glargine-lixisenatide     spironolactone 50 MG tablet  Commonly known as: ALDACTONE     TRADJENTA 5 mg Tab tablet  Generic drug: linaGLIPtin     TRIJARDY XR 25-5-1,000 mg Tbph  Generic drug: empaglifloz-linaglip-metformin            [facility] medications [facility]   [facility] [id number] [facility] [address]      Phone: [phone]   ciprofloxacin HCl 500 MG tablet  ketorolac 10 mg tablet  metroNIDAZOLE 500 MG tablet         PHYSICAL EXAM   VITALS: T 98.5 °F (36.9 °C)   /74   P 88   RR 20   O2 98 %    GENERAL: Awake and in NAD  LUNGS: CTA B/L  CVS: Normal rate  GI/: Soft, minimally TTP LLQ, bowel sounds positive.  EXTREMITIES: No peripheral  edema  NEURO: AAOx3  PSYCH: Cooperative        Discharge time: 33 minutes     Ken Walsh MD  [facility]       DIAGNOSITCS   CBC:   Recent Labs   Lab 06/11/23  0438 06/12/23  0331   WBC 11.11 7.60   HGB 13.8 11.4*   HCT 41.9 36.1*    269     CMP:   Recent Labs   Lab 06/11/23  0438 06/12/23  0331 06/13/23  0339   CALCIUM 9.8 8.6 8.9   ALBUMIN 3.7 3.1*  --     134* 143   K 4.2 3.7 3.9   CO2 24 24 25   BUN 15.6 14.7 11.0   CREATININE 0.91 0.78 0.75   ALKPHOS 91 74  --    ALT 27 21  --    AST 20 16  --    BILITOT 0.8 0.6  --    MG  --   --  1.70     Estimated Creatinine Clearance: 102 mL/min (based on SCr of 0.75 mg/dL).      CT Abdomen Pelvis With Contrast  Result Date: 6/11/2023  EXAMINATION CT ABDOMEN PELVIS WITH CONTRAST CLINICAL HISTORY LLQ abdominal pain; TECHNIQUE Post-contrast helical-acquisition CT images were obtained and multiplanar reformats accomplished by a CT technologist at a separate workstation, pushed to PACS for physician review. CONTRAST *IV: ISOVUE-370, 100 mL *Enteric: none COMPARISON 10 March 2020 FINDINGS Images were reviewed in soft tissue, lung, and bone windows. Exam quality: adequate for evaluation Lines/tubes: none visualized No acute pulmonary or cardiovascular findings appreciated.  There is no development of pericardial or pleural effusion. Gallbladder and biliary tree are unremarkable.  Portal vein patent.  Upper abdominal solid organs are without acute or suspicious focal finding.  There is similar diffuse hypoattenuation of the liver suggestive of steatosis.  Kidneys enhance in symmetric fashion, with no evidence of distal obstructive uropathy.  The urinary bladder is unremarkable.  No suspicious adnexal lesions.  Similar changes of hysterectomy. Esophagus and stomach are nondistended.  No findings of high-grade mechanical small bowel obstruction.  Large burden of fecal material is present throughout the course of the colon, suggesting constipation.  There is  similar scattered descending and sigmoid diverticulosis with acute changes of diverticulitis noted at the proximal sigmoid segment.  No definite organized fluid collection or gross extraluminal gas appreciated to indicate full-thickness perforation. There is no free intra-abdominal air or fluid.  No pathologic lymph node enlargement or necrotic adenopathy is evident. Body wall subcutaneous tissues and regional osseous structures are without of acute or destructive focal abnormality.   IMPRESSION   1. Acute sigmoid diverticulitis without evidence of gross mural perforation or drainable collection.   2. No other convincing acute abdominopelvic process.   3. Incidental findings suggestive of constipation.   4. Remaining chronic/secondary findings are similar to the March 2020 CT appearance.  Electronically signed by: Kevon Perry Date:    06/11/2023 Time:    05:46

## 2023-06-28 ENCOUNTER — HOSPITAL ENCOUNTER (OUTPATIENT)
Dept: RADIOLOGY | Facility: HOSPITAL | Age: 63
Discharge: HOME | End: 2023-06-28
Attending: INTERNAL MEDICINE
Payer: COMMERCIAL

## 2023-06-28 DIAGNOSIS — R89.6 ABNORMAL BLADDER CYTOLOGY: ICD-10-CM

## 2023-06-28 PROCEDURE — 25500020 PHARM REV CODE 255

## 2023-06-28 PROCEDURE — A9577 INJ MULTIHANCE: HCPCS

## 2023-06-28 PROCEDURE — 74183 MRI ABD W/O CNTR FLWD CNTR: CPT | Mod: TC

## 2023-06-28 RX ADMIN — GADOBENATE DIMEGLUMINE 20 ML: 529 INJECTION, SOLUTION INTRAVENOUS at 12:06

## 2023-07-28 ENCOUNTER — LAB VISIT (OUTPATIENT)
Dept: LAB | Facility: HOSPITAL | Age: 63
End: 2023-07-28
Attending: INTERNAL MEDICINE
Payer: COMMERCIAL

## 2023-07-28 DIAGNOSIS — K82.4 GALL BLADDER POLYP: ICD-10-CM

## 2023-07-28 DIAGNOSIS — R89.8 EOSINOPHILS INCREASED: ICD-10-CM

## 2023-07-28 DIAGNOSIS — Z80.0 FAMILY HISTORY OF MALIGNANT NEOPLASM OF DIGESTIVE ORGANS: Primary | ICD-10-CM

## 2023-07-28 LAB — CANCER AG19-9 SERPL-ACNC: 12.52 UNIT/ML (ref 0–37)

## 2023-07-28 PROCEDURE — 36415 COLL VENOUS BLD VENIPUNCTURE: CPT

## 2023-07-28 PROCEDURE — 86301 IMMUNOASSAY TUMOR CA 19-9: CPT

## 2023-11-06 NOTE — PROGRESS NOTES
Heme/Onc Progress Note    PATIENT: Jessie Greene  MRN: [id number]  DATE: 11/8/2023  Chief Complaint: Follow-up (Patient is here for her 1 year visit)      Oncology History Overview Note   05/04/2022::  Interim history:  The patient was last seen in the office on 04/12/2022.  Her aromatase inhibitor Arimidex was discontinued due to bone pain..  She underwent a nuclear medicine bone scan on 04/28/2022.  That nuclear medicine bone scan failed to reveal any significant suggestion of skeletal metastatic disease.  It was compared to her old CT from March of 2020. And this mild activity within the mid to lower thoracic spine is no significant change.  And her bone density is normal. She was thought to have mets at one time to thyroid, but that work up was not malignant in repeat evaluation and review    Diagnosis T2N1a, Stage IIb, Right lower outer quadrant infiltrating ductal carcinoma, diagnosed 10/23/19    Current Treatment:   Changed to Tamoxifen 7/5/22--stopped due to falls and vertigo worse  Arimidex (2/27/20)   Previous Femara (1/2/20) stopped 2/13/20 due to intolerability    Treatment History:  10/21/19: 2.8 cm mass in the right breast 8 o'clock position highly suggestive of malignancy with additional hypoechoic areas at 12:00, 2:00 9:00 and 10:00 suspicious for multicentric disease   highly suspicious of malignancy.  10/23/19: Ultrasound-guided biopsy of right breast 2 different sites: right breast biopsy 8 o'clock position 2 cm from the nipple invasive ductal carcinoma low-grade at least 1.6 cm with   microcalcifications with DCIS   ER 93.9% NE 90.8% Ki-67 23.7% HER-2/quang not expressed by FISH  11/6/19: CT C/A/P and bone scan-No conclusive evidence of osseous metastatic disease sclerotic lesion seen in the pelvis and the sacrum on comparison CT did not have clearly associated   uptake on the current bone scan but may be obscured by radiotracer uptake in the bladder. Degenerative uptake at L3. Uptake  at the frontal bones represents hyperostosis frontalis.  11/18/19: Bilateral mastectomy sentinel lymph node biopsy    Pathology: Invasive ductal carcinoma    2 foci: A. = 2.3 cm at the 8 o'clock position B.= 0.15 cm   G1, ER/WI positive, HER2 negative   Clear margins, Multiple intraductal papillomas   [place] lymph node 1 out of 2 positive measuring 1.2 cm    Left breast intraductal papillomas bilateral left sentinel lymph nodes negative.     Additional axillary content of the right and left without evidence of metastatic disease.   Summary: T2 N1 aM0 = right breast 1 out of 5 lymph nodes involved, negative margins   ER 93.9%, WI 90.8% HER-2/quang negative  ONCO-DX score 11  5/18/20: EGD,  and abdominal ultrasound by Dr. Schneider for pancreatic cancer screening    9/22/2020: Outside pathology report from primary care's office show metastatic breast carcinoma metastasis to the thyroid.  Due to the above.  Recommend the patient have a repeat CBC, CMP, tumor marker CA-15-3 and CA-27-29, and a PET/CT for thyroid metastasis from breast carcinoma to restage her.    10/2/2020 PET CT scan, periportal lymph node measures 12 mm in short axis with SUV 2.6, no other hypermetabolic lymph nodes identified.  No suspicious osseous metabolism.  Minimally enlarged periportal lymph node is mild hypermetabolism but the size is stable going back to 2019 this lymph node is indeterminate.    Partial right thyroidectomy, nodule hyperplasia some of the nodules show fibrosis and cystic generation calcification, one attached unremarkable parathyroid gland and 1 parathyroid lymph node negative for tumor.  No evidence of malignancy.  The review of the original tumor  show fragments of parathyroid tissue r       Malignant neoplasm of lower-outer quadrant of female breast   11/18/2019 Cancer Staged    Staging form: Breast, AJCC 8th Edition  - Pathologic stage from 11/18/2019: Stage IA (pT2, pN1a(sn), cM0, G1, ER+, WI+, HER2-, Oncotype DX  "score: 11)         11/08/2023  Patient present for 1 year follow up for Breast CA.    Last visit, patient has right-sided hearing loss due to infection, she is had injections by ENT with some improvement however she continues to have chronic vertigo.  Her newest complaint is her left-sided upper abdominal discomfort.  It happens more at night and with movement.  She denies any cough or shortness of breath.  It is not tender to palpation.  There is no rash.  This patient has been through letrozole, Aromasin, Arimidex, and now tamoxifen.  She is been intolerant to all of the above.  "They need to take those drugs off the market"              Past Medical History:   Diagnosis Date    Diabetes mellitus     Diverticulitis     Ectopic pregnancy     Fatty liver     Kidney stones         Current Outpatient Medications:     albuterol (PROVENTIL) 5 mg/mL nebulizer solution, Take 2.5 mg by nebulization every 6 (six) hours as needed for Wheezing., Disp: , Rfl:     FLUoxetine 40 MG capsule, Take 40 mg by mouth 2 (two) times daily., Disp: , Rfl:     fluticasone-umeclidin-vilanter (TRELEGY ELLIPTA) 100-62.5-25 mcg DsDv, Take by mouth., Disp: , Rfl:     JARDIANCE 25 mg tablet, Take 25 mg by mouth once daily. For 30 days, Disp: , Rfl:     metFORMIN (GLUCOPHAGE) 1000 MG tablet, Take 1,000 mg by mouth once daily., Disp: , Rfl:     montelukast (SINGULAIR) 10 mg tablet, Take 10 mg by mouth every other day., Disp: , Rfl:     pioglitazone (ACTOS) 30 MG tablet, Take 30 mg by mouth once daily., Disp: , Rfl:     rosuvastatin (CRESTOR) 10 MG tablet, Take 10 mg by mouth once daily., Disp: , Rfl:     spironolactone (ALDACTONE) 50 MG tablet, Take 50 mg by mouth once daily., Disp: , Rfl:     TRADJENTA 5 mg Tab tablet, Take 5 mg by mouth once daily., Disp: , Rfl:     VAGINAL CREAM APPLICATOR MISC, Ketoconazole 2%/Hydrocortisone 2.5%/ Zinc Oxide 20% 111 Cream [[id number]], Disp: , Rfl:      Review of Systems:   Review of Systems   Constitutional:  " Negative for appetite change and unexpected weight change.   HENT:  Negative for mouth sores.    Eyes:  Negative for visual disturbance.   Respiratory:  Negative for cough and shortness of breath.    Cardiovascular:  Positive for chest pain.   Gastrointestinal:  Positive for abdominal pain. Negative for diarrhea.   Genitourinary:  Positive for frequency.   Musculoskeletal:  Negative for back pain.   Skin:  Negative for rash.   Neurological:  Positive for headaches.   Hematological:  Negative for adenopathy.   Psychiatric/Behavioral:  The patient is nervous/anxious.           Objective:     Vitals:    11/08/23 1017   BP: 120/75   Pulse: 71   Temp: 98 °F (36.7 °C)           Physical Exam  Constitutional:       General: She is not in acute distress.     Appearance: Normal appearance. She is not ill-appearing, toxic-appearing or diaphoretic.   HENT:      Head: Normocephalic and atraumatic.   Eyes:      Extraocular Movements: Extraocular movements intact.      Pupils: Pupils are equal, round, and reactive to light.   Cardiovascular:      Rate and Rhythm: Normal rate and regular rhythm.   Pulmonary:      Effort: Pulmonary effort is normal.      Breath sounds: Normal breath sounds.   Abdominal:      General: Abdomen is flat. Bowel sounds are normal.      Palpations: Abdomen is soft.          Comments: Patient points this areas the area of tenderness that occurs with movement and with lying flat,    Musculoskeletal:      Cervical back: Neck supple.      Right lower leg: No edema.      Left lower leg: No edema.   Skin:     General: Skin is warm.   Neurological:      General: No focal deficit present.      Mental Status: She is alert and oriented to person, place, and time.      Gait: Gait normal.   Psychiatric:         Mood and Affect: Mood normal.         Behavior: Behavior normal.         Thought Content: Thought content normal.         Judgment: Judgment normal.          Lab Review:  CBC:   Recent Labs     11/06/23  0956    WBC 5.45   RBC 4.84   HGB 14.5   HCT 45.1        CMP:   Recent Labs     11/06/23  0956      K 4.8   CO2 25   BUN 23.3*   CREATININE 0.83   CALCIUM 10.0   ALBUMIN 4.1   BILITOT 0.5   ALKPHOS 110   AST 47*   ALT 47      Assessment and Plan            Oncology     Malignant neoplasm of lower-outer quadrant of female breast - Primary               Genetic     Monoallelic mutation of CHIQUIS gene     No evidence of disease  At 1 point there was a question of thyroid disease from breast cancer however her scans and thyroidectomy did not show any evidence of malignancy.  Intolerance to Femara, Arimidex, and  Aromasin and tamoxifen----risk of relapse and recurrence without medication discussed  Patient offered retry of medication again, low-dose tamoxifen, she does not want to go on preventative medication, encouraged exercise and weight loss  Continue F/u with Dr. Schneider with a CHIQUIS and CHELLE Adkins in Jan with primary care    Follow this patient yearly until November of 2024 then consider discharge to primary care  If no scan and Epic  with Dr. Schneider will reorder    Encouraged ADA diet   Encouraged Metamucil and fiber   Avoid seeds and nuts with diverticulosis         Follow up in about 1 year (around 11/8/2024).      Matias Castañeda MD

## 2023-11-08 ENCOUNTER — OFFICE VISIT (OUTPATIENT)
Dept: HEMATOLOGY/ONCOLOGY | Facility: CLINIC | Age: 63
End: 2023-11-08
Payer: COMMERCIAL

## 2023-11-08 VITALS
WEIGHT: 216.69 LBS | HEART RATE: 71 BPM | BODY MASS INDEX: 32.09 KG/M2 | DIASTOLIC BLOOD PRESSURE: 75 MMHG | SYSTOLIC BLOOD PRESSURE: 120 MMHG | OXYGEN SATURATION: 96 % | HEIGHT: 69 IN | TEMPERATURE: 98 F

## 2023-11-08 DIAGNOSIS — M50.90 CERVICAL DISC DISEASE: ICD-10-CM

## 2023-11-08 DIAGNOSIS — Z17.0 MALIGNANT NEOPLASM OF LOWER-OUTER QUADRANT OF RIGHT BREAST OF FEMALE, ESTROGEN RECEPTOR POSITIVE: Primary | ICD-10-CM

## 2023-11-08 DIAGNOSIS — Z15.01 MONOALLELIC MUTATION OF ATM GENE: ICD-10-CM

## 2023-11-08 DIAGNOSIS — Z15.09 MONOALLELIC MUTATION OF ATM GENE: ICD-10-CM

## 2023-11-08 DIAGNOSIS — Z15.89 MONOALLELIC MUTATION OF ATM GENE: ICD-10-CM

## 2023-11-08 DIAGNOSIS — C50.511 MALIGNANT NEOPLASM OF LOWER-OUTER QUADRANT OF RIGHT BREAST OF FEMALE, ESTROGEN RECEPTOR POSITIVE: Primary | ICD-10-CM

## 2023-11-08 DIAGNOSIS — Z79.811 AROMATASE INHIBITOR USE: ICD-10-CM

## 2023-11-08 PROCEDURE — 1159F PR MEDICATION LIST DOCUMENTED IN MEDICAL RECORD: ICD-10-PCS | Mod: CPTII,S$GLB,, | Performed by: INTERNAL MEDICINE

## 2023-11-08 PROCEDURE — 1160F PR REVIEW ALL MEDS BY PRESCRIBER/CLIN PHARMACIST DOCUMENTED: ICD-10-PCS | Mod: CPTII,S$GLB,, | Performed by: INTERNAL MEDICINE

## 2023-11-08 PROCEDURE — 3008F BODY MASS INDEX DOCD: CPT | Mod: CPTII,S$GLB,, | Performed by: INTERNAL MEDICINE

## 2023-11-08 PROCEDURE — 3078F PR MOST RECENT DIASTOLIC BLOOD PRESSURE < 80 MM HG: ICD-10-PCS | Mod: CPTII,S$GLB,, | Performed by: INTERNAL MEDICINE

## 2023-11-08 PROCEDURE — 3074F PR MOST RECENT SYSTOLIC BLOOD PRESSURE < 130 MM HG: ICD-10-PCS | Mod: CPTII,S$GLB,, | Performed by: INTERNAL MEDICINE

## 2023-11-08 PROCEDURE — 3078F DIAST BP <80 MM HG: CPT | Mod: CPTII,S$GLB,, | Performed by: INTERNAL MEDICINE

## 2023-11-08 PROCEDURE — 99999 PR PBB SHADOW E&M-EST. PATIENT-LVL III: CPT | Mod: PBBFAC,,, | Performed by: INTERNAL MEDICINE

## 2023-11-08 PROCEDURE — 3074F SYST BP LT 130 MM HG: CPT | Mod: CPTII,S$GLB,, | Performed by: INTERNAL MEDICINE

## 2023-11-08 PROCEDURE — 1160F RVW MEDS BY RX/DR IN RCRD: CPT | Mod: CPTII,S$GLB,, | Performed by: INTERNAL MEDICINE

## 2023-11-08 PROCEDURE — 99213 PR OFFICE/OUTPT VISIT, EST, LEVL III, 20-29 MIN: ICD-10-PCS | Mod: S$GLB,,, | Performed by: INTERNAL MEDICINE

## 2023-11-08 PROCEDURE — 3052F PR MOST RECENT HEMOGLOBIN A1C LEVEL 8.0 - < 9.0%: ICD-10-PCS | Mod: CPTII,S$GLB,, | Performed by: INTERNAL MEDICINE

## 2023-11-08 PROCEDURE — 1159F MED LIST DOCD IN RCRD: CPT | Mod: CPTII,S$GLB,, | Performed by: INTERNAL MEDICINE

## 2023-11-08 PROCEDURE — 3008F PR BODY MASS INDEX (BMI) DOCUMENTED: ICD-10-PCS | Mod: CPTII,S$GLB,, | Performed by: INTERNAL MEDICINE

## 2023-11-08 PROCEDURE — 99999 PR PBB SHADOW E&M-EST. PATIENT-LVL III: ICD-10-PCS | Mod: PBBFAC,,, | Performed by: INTERNAL MEDICINE

## 2023-11-08 PROCEDURE — 99213 OFFICE O/P EST LOW 20 MIN: CPT | Mod: S$GLB,,, | Performed by: INTERNAL MEDICINE

## 2023-11-08 PROCEDURE — 3052F HG A1C>EQUAL 8.0%<EQUAL 9.0%: CPT | Mod: CPTII,S$GLB,, | Performed by: INTERNAL MEDICINE

## 2024-02-15 ENCOUNTER — LAB VISIT (OUTPATIENT)
Dept: LAB | Facility: HOSPITAL | Age: 64
End: 2024-02-15
Attending: OBSTETRICS & GYNECOLOGY
Payer: COMMERCIAL

## 2024-02-15 DIAGNOSIS — N95.0 POSTMENOPAUSAL BLEEDING: Primary | ICD-10-CM

## 2024-02-15 LAB — CANCER AG125 SERPL-ACNC: 21.5 UNIT/ML (ref 0–35)

## 2024-02-15 PROCEDURE — 86304 IMMUNOASSAY TUMOR CA 125: CPT

## 2024-02-15 PROCEDURE — 36415 COLL VENOUS BLD VENIPUNCTURE: CPT

## 2024-04-09 DIAGNOSIS — H90.3 SENSORINEURAL HEARING LOSS, BILATERAL: Primary | ICD-10-CM

## 2024-04-15 ENCOUNTER — APPOINTMENT (OUTPATIENT)
Dept: LAB | Facility: HOSPITAL | Age: 64
End: 2024-04-15
Attending: NURSE PRACTITIONER
Payer: COMMERCIAL

## 2024-04-15 DIAGNOSIS — E11.9 DIABETES MELLITUS WITHOUT COMPLICATION: ICD-10-CM

## 2024-04-15 DIAGNOSIS — Z00.00 ROUTINE GENERAL MEDICAL EXAMINATION AT A HEALTH CARE FACILITY: Primary | ICD-10-CM

## 2024-04-15 LAB
ALBUMIN SERPL-MCNC: 3.8 G/DL (ref 3.4–4.8)
ALBUMIN/GLOB SERPL: 1.3 RATIO (ref 1.1–2)
ALP SERPL-CCNC: 104 UNIT/L (ref 40–150)
ALT SERPL-CCNC: 33 UNIT/L (ref 0–55)
APPEARANCE UR: CLEAR
AST SERPL-CCNC: 25 UNIT/L (ref 5–34)
BACTERIA #/AREA URNS AUTO: ABNORMAL /HPF
BASOPHILS # BLD AUTO: 0.04 X10(3)/MCL
BASOPHILS NFR BLD AUTO: 0.8 %
BILIRUB SERPL-MCNC: 0.4 MG/DL
BILIRUB UR QL STRIP.AUTO: NEGATIVE
BUN SERPL-MCNC: 24.1 MG/DL (ref 9.8–20.1)
CALCIUM SERPL-MCNC: 9.7 MG/DL (ref 8.4–10.2)
CHLORIDE SERPL-SCNC: 105 MMOL/L (ref 98–107)
CHOLEST SERPL-MCNC: 139 MG/DL
CHOLEST/HDLC SERPL: 4 {RATIO} (ref 0–5)
CO2 SERPL-SCNC: 24 MMOL/L (ref 23–31)
COLOR UR AUTO: YELLOW
CREAT SERPL-MCNC: 0.92 MG/DL (ref 0.55–1.02)
CREAT UR-MCNC: 200.1 MG/DL (ref 45–106)
EOSINOPHIL # BLD AUTO: 0.28 X10(3)/MCL (ref 0–0.9)
EOSINOPHIL NFR BLD AUTO: 5.9 %
ERYTHROCYTE [DISTWIDTH] IN BLOOD BY AUTOMATED COUNT: 15.4 % (ref 11.5–17)
EST. AVERAGE GLUCOSE BLD GHB EST-MCNC: 159.9 MG/DL
GFR SERPLBLD CREATININE-BSD FMLA CKD-EPI: >60 MLS/MIN/1.73/M2
GLOBULIN SER-MCNC: 3 GM/DL (ref 2.4–3.5)
GLUCOSE SERPL-MCNC: 146 MG/DL (ref 82–115)
GLUCOSE UR QL STRIP.AUTO: ABNORMAL
HBA1C MFR BLD: 7.2 %
HCT VFR BLD AUTO: 41.5 % (ref 37–47)
HDLC SERPL-MCNC: 39 MG/DL (ref 35–60)
HGB BLD-MCNC: 13.7 G/DL (ref 12–16)
HYALINE CASTS #/AREA URNS LPF: ABNORMAL /LPF
IMM GRANULOCYTES # BLD AUTO: 0.01 X10(3)/MCL (ref 0–0.04)
IMM GRANULOCYTES NFR BLD AUTO: 0.2 %
KETONES UR QL STRIP.AUTO: NEGATIVE
LDLC SERPL CALC-MCNC: 73 MG/DL (ref 50–140)
LEUKOCYTE ESTERASE UR QL STRIP.AUTO: NEGATIVE
LYMPHOCYTES # BLD AUTO: 2.12 X10(3)/MCL (ref 0.6–4.6)
LYMPHOCYTES NFR BLD AUTO: 44.4 %
MCH RBC QN AUTO: 30.3 PG (ref 27–31)
MCHC RBC AUTO-ENTMCNC: 33 G/DL (ref 33–36)
MCV RBC AUTO: 91.8 FL (ref 80–94)
MICROALBUMIN UR-MCNC: 15.1 UG/ML
MICROALBUMIN/CREAT RATIO PNL UR: 7.5 MG/GM CR (ref 0–30)
MONOCYTES # BLD AUTO: 0.29 X10(3)/MCL (ref 0.1–1.3)
MONOCYTES NFR BLD AUTO: 6.1 %
MUCOUS THREADS URNS QL MICRO: ABNORMAL /LPF
NEUTROPHILS # BLD AUTO: 2.03 X10(3)/MCL (ref 2.1–9.2)
NEUTROPHILS NFR BLD AUTO: 42.6 %
NITRITE UR QL STRIP.AUTO: NEGATIVE
NRBC BLD AUTO-RTO: 0 %
PH UR STRIP.AUTO: 5.5 [PH]
PLATELET # BLD AUTO: 306 X10(3)/MCL (ref 130–400)
PMV BLD AUTO: 8.6 FL (ref 7.4–10.4)
POTASSIUM SERPL-SCNC: 4.2 MMOL/L (ref 3.5–5.1)
PROT SERPL-MCNC: 6.8 GM/DL (ref 5.8–7.6)
PROT UR QL STRIP.AUTO: NEGATIVE
RBC # BLD AUTO: 4.52 X10(6)/MCL (ref 4.2–5.4)
RBC #/AREA URNS AUTO: ABNORMAL /HPF
RBC UR QL AUTO: NEGATIVE
SODIUM SERPL-SCNC: 137 MMOL/L (ref 136–145)
SP GR UR STRIP.AUTO: 1.04 (ref 1–1.03)
SQUAMOUS #/AREA URNS LPF: ABNORMAL /HPF
TRIGL SERPL-MCNC: 135 MG/DL (ref 37–140)
UROBILINOGEN UR STRIP-ACNC: NORMAL
VLDLC SERPL CALC-MCNC: 27 MG/DL
WBC # SPEC AUTO: 4.77 X10(3)/MCL (ref 4.5–11.5)
WBC #/AREA URNS AUTO: ABNORMAL /HPF

## 2024-04-15 PROCEDURE — 36415 COLL VENOUS BLD VENIPUNCTURE: CPT

## 2024-04-15 PROCEDURE — 81001 URINALYSIS AUTO W/SCOPE: CPT

## 2024-04-15 PROCEDURE — 82043 UR ALBUMIN QUANTITATIVE: CPT

## 2024-04-15 PROCEDURE — 80061 LIPID PANEL: CPT

## 2024-04-15 PROCEDURE — 80053 COMPREHEN METABOLIC PANEL: CPT

## 2024-04-15 PROCEDURE — 83036 HEMOGLOBIN GLYCOSYLATED A1C: CPT

## 2024-04-15 PROCEDURE — 85025 COMPLETE CBC W/AUTO DIFF WBC: CPT

## 2024-04-30 ENCOUNTER — HOSPITAL ENCOUNTER (OUTPATIENT)
Dept: RADIOLOGY | Facility: HOSPITAL | Age: 64
Discharge: HOME | End: 2024-04-30
Attending: INTERNAL MEDICINE
Payer: COMMERCIAL

## 2024-04-30 DIAGNOSIS — R07.89 OTHER CHEST PAIN: ICD-10-CM

## 2024-04-30 PROCEDURE — 71046 X-RAY EXAM CHEST 2 VIEWS: CPT | Mod: TC

## 2024-04-30 PROCEDURE — 71110 X-RAY EXAM RIBS BIL 3 VIEWS: CPT | Mod: TC

## 2024-05-16 ENCOUNTER — LAB VISIT (OUTPATIENT)
Dept: LAB | Facility: HOSPITAL | Age: 64
End: 2024-05-16
Attending: INTERNAL MEDICINE
Payer: COMMERCIAL

## 2024-05-16 DIAGNOSIS — K21.9 GASTROESOPHAGEAL REFLUX DISEASE, UNSPECIFIED WHETHER ESOPHAGITIS PRESENT: Primary | ICD-10-CM

## 2024-05-16 DIAGNOSIS — R93.2 ABNORMAL FINDINGS ON DIAGNOSTIC IMAGING OF LIVER: ICD-10-CM

## 2024-05-16 LAB — CANCER AG19-9 SERPL-ACNC: 9.66 UNIT/ML (ref 0–37)

## 2024-05-16 PROCEDURE — 36415 COLL VENOUS BLD VENIPUNCTURE: CPT

## 2024-05-16 PROCEDURE — 86301 IMMUNOASSAY TUMOR CA 19-9: CPT

## 2024-05-24 ENCOUNTER — HOSPITAL ENCOUNTER (OUTPATIENT)
Dept: RADIOLOGY | Facility: HOSPITAL | Age: 64
Discharge: HOME | End: 2024-05-24
Attending: OTOLARYNGOLOGY
Payer: COMMERCIAL

## 2024-05-24 DIAGNOSIS — H90.3 SENSORINEURAL HEARING LOSS, BILATERAL: ICD-10-CM

## 2024-05-24 PROCEDURE — 70553 MRI BRAIN STEM W/O & W/DYE: CPT | Mod: TC

## 2024-05-24 PROCEDURE — A9577 INJ MULTIHANCE: HCPCS | Performed by: OTOLARYNGOLOGY

## 2024-05-24 PROCEDURE — 25500020 PHARM REV CODE 255: Performed by: OTOLARYNGOLOGY

## 2024-05-24 RX ADMIN — GADOBENATE DIMEGLUMINE 20 ML: 529 INJECTION, SOLUTION INTRAVENOUS at 03:05

## 2024-08-14 ENCOUNTER — LAB VISIT (OUTPATIENT)
Dept: LAB | Facility: HOSPITAL | Age: 64
End: 2024-08-14
Attending: INTERNAL MEDICINE
Payer: COMMERCIAL

## 2024-08-14 DIAGNOSIS — E78.2 MIXED HYPERLIPIDEMIA: Primary | ICD-10-CM

## 2024-08-14 DIAGNOSIS — E11.9 DIABETES MELLITUS WITHOUT COMPLICATION: ICD-10-CM

## 2024-08-14 LAB
ALBUMIN SERPL-MCNC: 3.6 G/DL (ref 3.4–4.8)
ALBUMIN/GLOB SERPL: 1.1 RATIO (ref 1.1–2)
ALP SERPL-CCNC: 102 UNIT/L (ref 40–150)
ALT SERPL-CCNC: 39 UNIT/L (ref 0–55)
ANION GAP SERPL CALC-SCNC: 7 MEQ/L
AST SERPL-CCNC: 31 UNIT/L (ref 5–34)
BILIRUB SERPL-MCNC: 0.5 MG/DL
BUN SERPL-MCNC: 16.7 MG/DL (ref 9.8–20.1)
CALCIUM SERPL-MCNC: 9.2 MG/DL (ref 8.4–10.2)
CHLORIDE SERPL-SCNC: 109 MMOL/L (ref 98–107)
CHOLEST SERPL-MCNC: 123 MG/DL
CHOLEST/HDLC SERPL: 3 {RATIO} (ref 0–5)
CO2 SERPL-SCNC: 24 MMOL/L (ref 23–31)
CREAT SERPL-MCNC: 0.82 MG/DL (ref 0.55–1.02)
CREAT/UREA NIT SERPL: 20
EST. AVERAGE GLUCOSE BLD GHB EST-MCNC: 162.8 MG/DL
GFR SERPLBLD CREATININE-BSD FMLA CKD-EPI: >60 ML/MIN/1.73/M2
GLOBULIN SER-MCNC: 3.3 GM/DL (ref 2.4–3.5)
GLUCOSE SERPL-MCNC: 151 MG/DL (ref 82–115)
HBA1C MFR BLD: 7.3 %
HDLC SERPL-MCNC: 40 MG/DL (ref 35–60)
LDLC SERPL CALC-MCNC: 64 MG/DL (ref 50–140)
POTASSIUM SERPL-SCNC: 4 MMOL/L (ref 3.5–5.1)
PROT SERPL-MCNC: 6.9 GM/DL (ref 5.8–7.6)
SODIUM SERPL-SCNC: 140 MMOL/L (ref 136–145)
TRIGL SERPL-MCNC: 93 MG/DL (ref 37–140)
VLDLC SERPL CALC-MCNC: 19 MG/DL

## 2024-08-14 PROCEDURE — 80053 COMPREHEN METABOLIC PANEL: CPT

## 2024-08-14 PROCEDURE — 36415 COLL VENOUS BLD VENIPUNCTURE: CPT

## 2024-08-14 PROCEDURE — 83036 HEMOGLOBIN GLYCOSYLATED A1C: CPT

## 2024-08-14 PROCEDURE — 80061 LIPID PANEL: CPT

## 2024-11-11 NOTE — PROGRESS NOTES
Heme/Onc Progress Note    PATIENT: Windy Greene  MRN: [id number]  DATE: 11/12/2024  Chief Complaint: 1yr f/u    Tamoxifen November 12, 2024 (5 mg p.o. b.i.d.)    Oncology History Overview Note   05/04/2022::  Interim history:  The patient was last seen in the office on 04/12/2022.  Her aromatase inhibitor Arimidex was discontinued due to bone pain..  She underwent a nuclear medicine bone scan on 04/28/2022.  That nuclear medicine bone scan failed to reveal any significant suggestion of skeletal metastatic disease.  It was compared to her old CT from March of 2020. And this mild activity within the mid to lower thoracic spine is no significant change.  And her bone density is normal. She was thought to have mets at one time to thyroid, but that work up was not malignant in repeat evaluation and review    Diagnosis T2N1a, Stage IIb, Right lower outer quadrant infiltrating ductal carcinoma, diagnosed 10/23/19    Current Treatment:   Changed to Tamoxifen 7/5/22--stopped due to falls and vertigo worse  Arimidex (2/27/20)   Previous Femara (1/2/20) stopped 2/13/20 due to intolerability    Treatment History:  10/21/19: 2.8 cm mass in the right breast 8 o'clock position highly suggestive of malignancy with additional hypoechoic areas at 12:00, 2:00 9:00 and 10:00 suspicious for multicentric disease   highly suspicious of malignancy.  10/23/19: Ultrasound-guided biopsy of right breast 2 different sites: right breast biopsy 8 o'clock position 2 cm from the nipple invasive ductal carcinoma low-grade at least 1.6 cm with   microcalcifications with DCIS   ER 93.9% MS 90.8% Ki-67 23.7% HER-2/quang not expressed by FISH  11/6/19: CT C/A/P and bone scan-No conclusive evidence of osseous metastatic disease sclerotic lesion seen in the pelvis and the sacrum on comparison CT did not have clearly associated   uptake on the current bone scan but may be obscured by radiotracer uptake in the bladder. Degenerative uptake at L3.  Uptake at the frontal bones represents hyperostosis frontalis.  11/18/19: Bilateral mastectomy sentinel lymph node biopsy    Pathology: Invasive ductal carcinoma    2 foci: A. = 2.3 cm at the 8 o'clock position B.= 0.15 cm   G1, ER/NC positive, HER2 negative   Clear margins, Multiple intraductal papillomas   [place] lymph node 1 out of 2 positive measuring 1.2 cm    Left breast intraductal papillomas bilateral left sentinel lymph nodes negative.     Additional axillary content of the right and left without evidence of metastatic disease.   Summary: T2 N1 aM0 = right breast 1 out of 5 lymph nodes involved, negative margins   ER 93.9%, NC 90.8% HER-2/quang negative  ONCO-DX score 11  5/18/20: EGD,  and abdominal ultrasound by Dr. Schneider for pancreatic cancer screening    9/22/2020: Outside pathology report from primary care's office show metastatic breast carcinoma metastasis to the thyroid.  Due to the above.  Recommend the patient have a repeat CBC, CMP, tumor marker CA-15-3 and CA-27-29, and a PET/CT for thyroid metastasis from breast carcinoma to restage her.    10/2/2020 PET CT scan, periportal lymph node measures 12 mm in short axis with SUV 2.6, no other hypermetabolic lymph nodes identified.  No suspicious osseous metabolism.  Minimally enlarged periportal lymph node is mild hypermetabolism but the size is stable going back to 2019 this lymph node is indeterminate.    Partial right thyroidectomy, nodule hyperplasia some of the nodules show fibrosis and cystic generation calcification, one attached unremarkable parathyroid gland and 1 parathyroid lymph node negative for tumor.  No evidence of malignancy.  The review of the original tumor  show fragments of parathyroid tissue r       Malignant neoplasm of lower-outer quadrant of female breast   11/18/2019 Cancer Staged    Staging form: Breast, AJCC 8th Edition  - Pathologic stage from 11/18/2019: Stage IA (pT2, pN1a(sn), cM0, G1, ER+, NC+, HER2-, Oncotype  "DX score: 11)         11/12/2024  Patient present for 1 year follow up for Breast CA; she has had bilateral mastectomy.  She is in good spirits; states she gets occasional itching under arms.  Thyroid removed; occasional right sided neck swelling.  Encouraged follow up with ENT.  She is followed by Dr. Schneider; no longer taking Metformin due to gas and indigestion.  Patient states she has issues with things like water "sitting on her chest".  Encouraged follow up appointment with Dr. Schneider for gas, indigestion and swallowing issues  Also sees PCP for blood sugar.  Discussed increased blood sugar can cause itching, yeast infections.  Discussed nerve ending and phantom pain.  She is followed by Dr. Perez for her hearing loss.  Patient is intolerable of AI and not taking any RX.  Discussed low dose Tamoxifen; she is willing to try.  Goal to take a few years.  Discussed future appointments.          Past Medical History:   Diagnosis Date    Diabetes mellitus     Diverticulitis     Ectopic pregnancy     Fatty liver     Kidney stones         Current Outpatient Medications:     albuterol (PROVENTIL) 5 mg/mL nebulizer solution, Take 2.5 mg by nebulization every 6 (six) hours as needed for Wheezing., Disp: , Rfl:     FLUoxetine 40 MG capsule, Take 40 mg by mouth 2 (two) times daily., Disp: , Rfl:     fluticasone-umeclidin-vilanter (TRELEGY ELLIPTA) 100-62.5-25 mcg DsDv, Take by mouth., Disp: , Rfl:     hydrOXYzine HCL (ATARAX) 25 MG tablet, Take 25 mg by mouth once daily., Disp: , Rfl:     JARDIANCE 25 mg tablet, Take 25 mg by mouth once daily. For 30 days, Disp: , Rfl:     montelukast (SINGULAIR) 10 mg tablet, Take 10 mg by mouth every other day., Disp: , Rfl:     pioglitazone (ACTOS) 30 MG tablet, Take 30 mg by mouth once daily., Disp: , Rfl:     rosuvastatin (CRESTOR) 10 MG tablet, Take 10 mg by mouth once daily., Disp: , Rfl:     spironolactone (ALDACTONE) 50 MG tablet, Take 50 mg by mouth once daily., Disp: , Rfl: "     TRADJENTA 5 mg Tab tablet, Take 5 mg by mouth once daily., Disp: , Rfl:     VAGINAL CREAM APPLICATOR MISC, Ketoconazole 2%/Hydrocortisone 2.5%/ Zinc Oxide 20% 111 Cream [[id number]], Disp: , Rfl:     tamoxifen (NOLVADEX) 10 MG Tab, Take half p.o. b.i.d. (5 mg twice a day., Disp: 90 tablet, Rfl: 1     Review of Systems:   Review of Systems   Constitutional:  Negative for appetite change and unexpected weight change.   HENT:  Negative for mouth sores.    Eyes:  Negative for visual disturbance.   Respiratory:  Negative for cough and shortness of breath.    Cardiovascular:  Positive for chest pain.   Gastrointestinal:  Positive for abdominal pain. Negative for diarrhea.   Genitourinary:  Positive for frequency.   Musculoskeletal:  Negative for back pain.   Skin:  Negative for rash.   Neurological:  Positive for headaches.   Hematological:  Negative for adenopathy.   Psychiatric/Behavioral:  The patient is nervous/anxious.           Objective:     Vitals:    11/12/24 1053   BP: 118/75   Pulse: (!) 58   Resp: 18   Temp: 97.8 °F (36.6 °C)             Physical Exam  Exam conducted with a chaperone present.   Constitutional:       General: She is not in acute distress.     Appearance: Normal appearance. She is not ill-appearing, toxic-appearing or diaphoretic.   HENT:      Head: Normocephalic and atraumatic.   Eyes:      Extraocular Movements: Extraocular movements intact.      Pupils: Pupils are equal, round, and reactive to light.   Cardiovascular:      Rate and Rhythm: Normal rate and regular rhythm.   Pulmonary:      Effort: Pulmonary effort is normal.      Breath sounds: Normal breath sounds.   Chest:   Breasts:     Right: Absent. No swelling, bleeding, mass or skin change.      Left: Absent. No swelling, bleeding or skin change.   Abdominal:      General: Abdomen is flat. Bowel sounds are normal.      Palpations: Abdomen is soft.          Comments: Patient points this areas the area of tenderness that occurs with  movement and with lying flat,    Musculoskeletal:      Cervical back: Neck supple.      Right lower leg: No edema.      Left lower leg: No edema.   Lymphadenopathy:      Upper Body:      Right upper body: No supraclavicular or axillary adenopathy.      Left upper body: No supraclavicular or axillary adenopathy.   Skin:     General: Skin is warm.   Neurological:      General: No focal deficit present.      Mental Status: She is alert and oriented to person, place, and time.      Gait: Gait normal.   Psychiatric:         Mood and Affect: Mood normal.         Behavior: Behavior normal.         Thought Content: Thought content normal.         Judgment: Judgment normal.            Assessment and Plan            Oncology     Malignant neoplasm of lower-outer quadrant of female breast - Primary               Genetic     Monoallelic mutation of CHIQUIS gene     No evidence of disease  At 1 point there was a question of thyroid disease from breast cancer however her scans and thyroidectomy did not show any evidence of malignancy.  Intolerance to Femara, Arimidex, and  Aromasin and tamoxifen----risk of relapse and recurrence without medication discussed  Patient offered retry of medication again, low-dose tamoxifen, she does want to go on preventative medication, encouraged exercise and weight loss--will try to continue for 3 years (11/27 if tolerated)  Continue F/u with Dr. Schneider with a CHIQUIS and [place]  Dr. Adkins in Jan with primary care        Encouraged ADA diet   Encouraged Metamucil and fiber   Avoid seeds and nuts with diverticulosis         Follow up in about 6 months (around 5/12/2025) for Labs cmp, OV with Garry.      Orders Placed This Encounter    tamoxifen (NOLVADEX) 10 MG Tab      Matias Castañeda MD

## 2024-11-12 ENCOUNTER — OFFICE VISIT (OUTPATIENT)
Dept: HEMATOLOGY/ONCOLOGY | Facility: CLINIC | Age: 64
End: 2024-11-12
Payer: COMMERCIAL

## 2024-11-12 VITALS
DIASTOLIC BLOOD PRESSURE: 75 MMHG | WEIGHT: 222.5 LBS | SYSTOLIC BLOOD PRESSURE: 118 MMHG | TEMPERATURE: 98 F | BODY MASS INDEX: 32.95 KG/M2 | OXYGEN SATURATION: 98 % | RESPIRATION RATE: 18 BRPM | HEIGHT: 69 IN | HEART RATE: 58 BPM

## 2024-11-12 DIAGNOSIS — Z15.89 MONOALLELIC MUTATION OF ATM GENE: ICD-10-CM

## 2024-11-12 DIAGNOSIS — Z79.811 AROMATASE INHIBITOR USE: ICD-10-CM

## 2024-11-12 DIAGNOSIS — C50.511 MALIGNANT NEOPLASM OF LOWER-OUTER QUADRANT OF RIGHT BREAST OF FEMALE, ESTROGEN RECEPTOR POSITIVE: Primary | ICD-10-CM

## 2024-11-12 DIAGNOSIS — Z17.0 MALIGNANT NEOPLASM OF LOWER-OUTER QUADRANT OF RIGHT BREAST OF FEMALE, ESTROGEN RECEPTOR POSITIVE: Primary | ICD-10-CM

## 2024-11-12 DIAGNOSIS — Z15.01 MONOALLELIC MUTATION OF ATM GENE: ICD-10-CM

## 2024-11-12 DIAGNOSIS — Z15.09 MONOALLELIC MUTATION OF ATM GENE: ICD-10-CM

## 2024-11-12 PROCEDURE — 3074F SYST BP LT 130 MM HG: CPT | Mod: CPTII,S$GLB,, | Performed by: INTERNAL MEDICINE

## 2024-11-12 PROCEDURE — 1159F MED LIST DOCD IN RCRD: CPT | Mod: CPTII,S$GLB,, | Performed by: INTERNAL MEDICINE

## 2024-11-12 PROCEDURE — 3061F NEG MICROALBUMINURIA REV: CPT | Mod: CPTII,S$GLB,, | Performed by: INTERNAL MEDICINE

## 2024-11-12 PROCEDURE — 1160F RVW MEDS BY RX/DR IN RCRD: CPT | Mod: CPTII,S$GLB,, | Performed by: INTERNAL MEDICINE

## 2024-11-12 PROCEDURE — 3066F NEPHROPATHY DOC TX: CPT | Mod: CPTII,S$GLB,, | Performed by: INTERNAL MEDICINE

## 2024-11-12 PROCEDURE — 3078F DIAST BP <80 MM HG: CPT | Mod: CPTII,S$GLB,, | Performed by: INTERNAL MEDICINE

## 2024-11-12 PROCEDURE — 3008F BODY MASS INDEX DOCD: CPT | Mod: CPTII,S$GLB,, | Performed by: INTERNAL MEDICINE

## 2024-11-12 PROCEDURE — 3051F HG A1C>EQUAL 7.0%<8.0%: CPT | Mod: CPTII,S$GLB,, | Performed by: INTERNAL MEDICINE

## 2024-11-12 PROCEDURE — 99214 OFFICE O/P EST MOD 30 MIN: CPT | Mod: S$GLB,,, | Performed by: INTERNAL MEDICINE

## 2024-11-12 PROCEDURE — 99999 PR PBB SHADOW E&M-EST. PATIENT-LVL V: CPT | Mod: PBBFAC,,, | Performed by: INTERNAL MEDICINE

## 2024-11-12 RX ORDER — HYDROXYZINE HYDROCHLORIDE 25 MG/1
25 TABLET, FILM COATED ORAL DAILY
COMMUNITY

## 2024-11-12 RX ORDER — TAMOXIFEN CITRATE 10 MG/1
TABLET ORAL
Qty: 90 TABLET | Refills: 1 | Status: SHIPPED | OUTPATIENT
Start: 2024-11-12

## 2024-12-20 ENCOUNTER — PATIENT MESSAGE (OUTPATIENT)
Dept: HEMATOLOGY/ONCOLOGY | Facility: CLINIC | Age: 64
End: 2024-12-20
Payer: COMMERCIAL

## 2025-01-13 ENCOUNTER — LAB VISIT (OUTPATIENT)
Dept: LAB | Facility: HOSPITAL | Age: 65
End: 2025-01-13
Attending: INTERNAL MEDICINE
Payer: COMMERCIAL

## 2025-01-13 DIAGNOSIS — E11.9 DIABETES MELLITUS WITHOUT COMPLICATION: ICD-10-CM

## 2025-01-13 DIAGNOSIS — Z00.00 ROUTINE GENERAL MEDICAL EXAMINATION AT A HEALTH CARE FACILITY: Primary | ICD-10-CM

## 2025-01-13 LAB
ALBUMIN SERPL-MCNC: 3.8 G/DL (ref 3.4–4.8)
ALBUMIN/GLOB SERPL: 1.2 RATIO (ref 1.1–2)
ALP SERPL-CCNC: 123 UNIT/L (ref 40–150)
ALT SERPL-CCNC: 30 UNIT/L (ref 0–55)
ANION GAP SERPL CALC-SCNC: 8 MEQ/L
AST SERPL-CCNC: 28 UNIT/L (ref 5–34)
BACTERIA #/AREA URNS AUTO: ABNORMAL /HPF
BASOPHILS # BLD AUTO: 0.04 X10(3)/MCL
BASOPHILS NFR BLD AUTO: 0.9 %
BILIRUB SERPL-MCNC: 0.6 MG/DL
BILIRUB UR QL STRIP.AUTO: NEGATIVE
BUN SERPL-MCNC: 22.9 MG/DL (ref 9.8–20.1)
CALCIUM SERPL-MCNC: 9.6 MG/DL (ref 8.4–10.2)
CHLORIDE SERPL-SCNC: 103 MMOL/L (ref 98–107)
CHOLEST SERPL-MCNC: 137 MG/DL
CHOLEST/HDLC SERPL: 3 {RATIO} (ref 0–5)
CLARITY UR: CLEAR
CO2 SERPL-SCNC: 26 MMOL/L (ref 23–31)
COLOR UR AUTO: ABNORMAL
CREAT SERPL-MCNC: 0.93 MG/DL (ref 0.55–1.02)
CREAT UR-MCNC: 146.1 MG/DL (ref 45–106)
CREAT/UREA NIT SERPL: 25
EOSINOPHIL # BLD AUTO: 0.28 X10(3)/MCL (ref 0–0.9)
EOSINOPHIL NFR BLD AUTO: 6 %
ERYTHROCYTE [DISTWIDTH] IN BLOOD BY AUTOMATED COUNT: 15.3 % (ref 11.5–17)
EST. AVERAGE GLUCOSE BLD GHB EST-MCNC: 171.4 MG/DL
GFR SERPLBLD CREATININE-BSD FMLA CKD-EPI: >60 ML/MIN/1.73/M2
GLOBULIN SER-MCNC: 3.3 GM/DL (ref 2.4–3.5)
GLUCOSE SERPL-MCNC: 141 MG/DL (ref 82–115)
GLUCOSE UR QL STRIP: ABNORMAL
HBA1C MFR BLD: 7.6 %
HCT VFR BLD AUTO: 43.2 % (ref 37–47)
HDLC SERPL-MCNC: 41 MG/DL (ref 35–60)
HGB BLD-MCNC: 14 G/DL (ref 12–16)
HGB UR QL STRIP: NEGATIVE
IMM GRANULOCYTES # BLD AUTO: 0.02 X10(3)/MCL (ref 0–0.04)
IMM GRANULOCYTES NFR BLD AUTO: 0.4 %
KETONES UR QL STRIP: NEGATIVE
LDLC SERPL CALC-MCNC: 78 MG/DL (ref 50–140)
LEUKOCYTE ESTERASE UR QL STRIP: 75
LYMPHOCYTES # BLD AUTO: 2.15 X10(3)/MCL (ref 0.6–4.6)
LYMPHOCYTES NFR BLD AUTO: 46.4 %
MCH RBC QN AUTO: 29.5 PG (ref 27–31)
MCHC RBC AUTO-ENTMCNC: 32.4 G/DL (ref 33–36)
MCV RBC AUTO: 91.1 FL (ref 80–94)
MICROALBUMIN UR-MCNC: 10.1 UG/ML
MICROALBUMIN/CREAT RATIO PNL UR: 6.9 MG/GM CR (ref 0–30)
MONOCYTES # BLD AUTO: 0.24 X10(3)/MCL (ref 0.1–1.3)
MONOCYTES NFR BLD AUTO: 5.2 %
MUCOUS THREADS URNS QL MICRO: ABNORMAL /LPF
NEUTROPHILS # BLD AUTO: 1.9 X10(3)/MCL (ref 2.1–9.2)
NEUTROPHILS NFR BLD AUTO: 41.1 %
NITRITE UR QL STRIP: NEGATIVE
NRBC BLD AUTO-RTO: 0 %
PH UR STRIP: 5.5 [PH]
PLATELET # BLD AUTO: 363 X10(3)/MCL (ref 130–400)
PMV BLD AUTO: 8.2 FL (ref 7.4–10.4)
POTASSIUM SERPL-SCNC: 4.3 MMOL/L (ref 3.5–5.1)
PROT SERPL-MCNC: 7.1 GM/DL (ref 5.8–7.6)
PROT UR QL STRIP: NEGATIVE
RBC # BLD AUTO: 4.74 X10(6)/MCL (ref 4.2–5.4)
RBC #/AREA URNS AUTO: ABNORMAL /HPF
SODIUM SERPL-SCNC: 137 MMOL/L (ref 136–145)
SP GR UR STRIP.AUTO: 1.04 (ref 1–1.03)
SQUAMOUS #/AREA URNS LPF: ABNORMAL /HPF
TRIGL SERPL-MCNC: 92 MG/DL (ref 37–140)
UROBILINOGEN UR STRIP-ACNC: NORMAL
VLDLC SERPL CALC-MCNC: 18 MG/DL
WBC # BLD AUTO: 4.63 X10(3)/MCL (ref 4.5–11.5)
WBC #/AREA URNS AUTO: ABNORMAL /HPF

## 2025-01-13 PROCEDURE — 85025 COMPLETE CBC W/AUTO DIFF WBC: CPT

## 2025-01-13 PROCEDURE — 80061 LIPID PANEL: CPT

## 2025-01-13 PROCEDURE — 80053 COMPREHEN METABOLIC PANEL: CPT

## 2025-01-13 PROCEDURE — 81001 URINALYSIS AUTO W/SCOPE: CPT

## 2025-01-13 PROCEDURE — 83036 HEMOGLOBIN GLYCOSYLATED A1C: CPT

## 2025-01-13 PROCEDURE — 82570 ASSAY OF URINE CREATININE: CPT

## 2025-01-13 PROCEDURE — 36415 COLL VENOUS BLD VENIPUNCTURE: CPT

## 2025-01-26 NOTE — PROGRESS NOTES
Heme/Onc Progress Note    PATIENT: Windy Greene  MRN: [id number]  DATE: 1/31/2025  Chief Complaint: No chief complaint on file.    Tamoxifen November 12, 2024 (5 mg p.o. b.i.d.) stopped due to intolerance    Oncology History Overview Note   05/04/2022::  Interim history:  The patient was last seen in the office on 04/12/2022.  Her aromatase inhibitor Arimidex was discontinued due to bone pain..  She underwent a nuclear medicine bone scan on 04/28/2022.  That nuclear medicine bone scan failed to reveal any significant suggestion of skeletal metastatic disease.  It was compared to her old CT from March of 2020. And this mild activity within the mid to lower thoracic spine is no significant change.  And her bone density is normal. She was thought to have mets at one time to thyroid, but that work up was not malignant in repeat evaluation and review    Diagnosis T2N1a, Stage IIb, Right lower outer quadrant infiltrating ductal carcinoma, diagnosed 10/23/19    Current Treatment:   Changed to Tamoxifen 7/5/22--stopped due to falls and vertigo worse  Arimidex (2/27/20)   Previous Femara (1/2/20) stopped 2/13/20 due to intolerability    Treatment History:  10/21/19: 2.8 cm mass in the right breast 8 o'clock position highly suggestive of malignancy with additional hypoechoic areas at 12:00, 2:00 9:00 and 10:00 suspicious for multicentric disease   highly suspicious of malignancy.  10/23/19: Ultrasound-guided biopsy of right breast 2 different sites: right breast biopsy 8 o'clock position 2 cm from the nipple invasive ductal carcinoma low-grade at least 1.6 cm with   microcalcifications with DCIS   ER 93.9% MA 90.8% Ki-67 23.7% HER-2/quang not expressed by FISH  11/6/19: CT C/A/P and bone scan-No conclusive evidence of osseous metastatic disease sclerotic lesion seen in the pelvis and the sacrum on comparison CT did not have clearly associated   uptake on the current bone scan but may be obscured by radiotracer uptake  in the bladder. Degenerative uptake at L3. Uptake at the frontal bones represents hyperostosis frontalis.  11/18/19: Bilateral mastectomy sentinel lymph node biopsy    Pathology: Invasive ductal carcinoma    2 foci: A. = 2.3 cm at the 8 o'clock position B.= 0.15 cm   G1, ER/OR positive, HER2 negative   Clear margins, Multiple intraductal papillomas   [place] lymph node 1 out of 2 positive measuring 1.2 cm    Left breast intraductal papillomas bilateral left sentinel lymph nodes negative.     Additional axillary content of the right and left without evidence of metastatic disease.   Summary: T2 N1 aM0 = right breast 1 out of 5 lymph nodes involved, negative margins   ER 93.9%, OR 90.8% HER-2/quang negative  ONCO-DX score 11  5/18/20: EGD,  and abdominal ultrasound by Dr. Schneider for pancreatic cancer screening    9/22/2020: Outside pathology report from primary care's office show metastatic breast carcinoma metastasis to the thyroid.  Due to the above.  Recommend the patient have a repeat CBC, CMP, tumor marker CA-15-3 and CA-27-29, and a PET/CT for thyroid metastasis from breast carcinoma to restage her.    10/2/2020 PET CT scan, periportal lymph node measures 12 mm in short axis with SUV 2.6, no other hypermetabolic lymph nodes identified.  No suspicious osseous metabolism.  Minimally enlarged periportal lymph node is mild hypermetabolism but the size is stable going back to 2019 this lymph node is indeterminate.    Partial right thyroidectomy, nodule hyperplasia some of the nodules show fibrosis and cystic generation calcification, one attached unremarkable parathyroid gland and 1 parathyroid lymph node negative for tumor.  No evidence of malignancy.  The review of the original tumor  show fragments of parathyroid tissue r       History Malignant neoplasm of lower-outer quadrant of female breast   11/18/2019 Cancer Staged    Staging form: Breast, AJCC 8th Edition  - Pathologic stage from 11/18/2019: Stage IA  "(pT2, pN1a(sn), cM0, G1, ER+, DE+, HER2-, Oncotype DX score: 11)         01/31/2025  Patient present for 1 year follow up for Breast CA; she has had bilateral mastectomy.  She is in good spirits; states she gets occasional itching under arms.  Thyroid removed; occasional right sided neck swelling.  Encouraged follow up with ENT.  She is followed by Dr. Schneider; no longer taking Metformin due to gas and indigestion.  Patient states she has issues with things like water "sitting on her chest".  Encouraged follow up appointment with Dr. Schneider for gas, indigestion and swallowing issues  Also sees PCP for blood sugar.  Discussed increased blood sugar can cause itching, yeast infections.  Discussed nerve ending and phantom pain.  She is followed by Dr. Perez for her hearing loss.  Patient is intolerable of AI and not taking any RX.  Discussed low dose Tamoxifen; she is willing to try.  Goal to take a few years.  Discussed future appointments.        Past Medical History:   Diagnosis Date    Diabetes mellitus     Diverticulitis     Ectopic pregnancy     Fatty liver     Kidney stones         Current Outpatient Medications:     albuterol (PROVENTIL) 5 mg/mL nebulizer solution, Take 2.5 mg by nebulization every 6 (six) hours as needed for Wheezing., Disp: , Rfl:     FLUoxetine 40 MG capsule, Take 40 mg by mouth 2 (two) times daily., Disp: , Rfl:     fluticasone-umeclidin-vilanter (TRELEGY ELLIPTA) 100-62.5-25 mcg DsDv, Take by mouth., Disp: , Rfl:     hydrOXYzine HCL (ATARAX) 25 MG tablet, Take 25 mg by mouth once daily., Disp: , Rfl:     JARDIANCE 25 mg tablet, Take 25 mg by mouth once daily. For 30 days, Disp: , Rfl:     montelukast (SINGULAIR) 10 mg tablet, Take 10 mg by mouth every other day., Disp: , Rfl:     pioglitazone (ACTOS) 30 MG tablet, Take 30 mg by mouth once daily., Disp: , Rfl:     rosuvastatin (CRESTOR) 10 MG tablet, Take 10 mg by mouth once daily., Disp: , Rfl:     spironolactone (ALDACTONE) 50 MG " tablet, Take 50 mg by mouth once daily., Disp: , Rfl:     TRADJENTA 5 mg Tab tablet, Take 5 mg by mouth once daily., Disp: , Rfl:     VAGINAL CREAM APPLICATOR MISC, Ketoconazole 2%/Hydrocortisone 2.5%/ Zinc Oxide 20% 111 Cream [70038], Disp: , Rfl:      Review of Systems:   Review of Systems   Constitutional:  Negative for appetite change and unexpected weight change.   HENT:  Negative for mouth sores.    Eyes:  Negative for visual disturbance.   Respiratory:  Negative for cough and shortness of breath.    Gastrointestinal:  Negative for abdominal pain and diarrhea.   Genitourinary:  Positive for frequency.   Musculoskeletal:  Positive for arthralgias.   Skin:  Negative for rash.   Neurological:  Positive for headaches.   Hematological:  Negative for adenopathy.   Psychiatric/Behavioral:  The patient is nervous/anxious.           Objective:     There were no vitals filed for this visit.            Physical Exam  Constitutional:       Appearance: Normal appearance.   Neurological:      General: No focal deficit present.      Mental Status: She is alert and oriented to person, place, and time.         Lab Review:  CBC:   Recent Labs     01/13/25  0900   WBC 4.63   RBC 4.74   HGB 14.0   HCT 43.2        CMP:   Recent Labs     01/13/25  0900      K 4.3      CO2 26   BUN 22.9*   CREATININE 0.93   CALCIUM 9.6   LABPROT 7.1   ALBUMIN 3.8   BILITOT 0.6   ALKPHOS 123   AST 28   ALT 30         Assessment and Plan   1. History of malignant neoplasm of the breast right lower outer quadrant estrogen positive  - Comprehensive Metabolic Panel    2. Encounter for monitoring tamoxifen therapy    3. Monoallelic mutation of CHIQUIS gene              No evidence of disease  At 1 point there was a question of thyroid disease from breast cancer however her scans and thyroidectomy did not show any evidence of malignancy.    Intolerance to Femara, Arimidex, and  Aromasin and tamoxifen----risk of relapse and recurrence without  "medication discussed  She was intolerance of tamoxifen as well.   retry of medication again, low-dose tamoxifen,   , encouraged exercise and weight loss--       Genetic testing revealed two (2) variants of uncertain significance (VUS): CHIQUIS p.P113S and  MRE11A p.L7F. This MRE11A variant appears to be benign. However, the CHIQUIS variant is "inconclusive" and Ms. Greene's personal and family history is consistent with the presence of a pathological CHIQUIS mutation (breast cancer and pancreatic cancer). Due to her family history, it is recommended that she continue for pancreatic cancer and with  Dr. Ministerio Schneider   Continue F/u with Dr. Schneider with a CHIQUIS and CORBETT    Dr. Adkins  to follow  With her SSRI not working.  And routine follow-up  And if her hip pain does not recover consider imaging          Follow up if symptoms worsen or fail to improve.            Matias Castañeda MD    Telemed: This visit was a telemedicine/telephone visit.  Real-time audio and video were used following [facility] protocols.  The patient and/or family agreed to the security of information at the location.  I was located in the [facility] with this visit occurred.  The patient was located.  At home.  I am licensed to practice in the [facility].    --total time 20 minutes:  15 minutes was spent with the patient, 5 minutes documentation and review    "

## 2025-01-30 PROBLEM — Z51.81 ENCOUNTER FOR MONITORING TAMOXIFEN THERAPY: Status: ACTIVE | Noted: 2025-01-30

## 2025-01-30 PROBLEM — Z79.810 ENCOUNTER FOR MONITORING TAMOXIFEN THERAPY: Status: ACTIVE | Noted: 2025-01-30

## 2025-01-31 ENCOUNTER — OFFICE VISIT (OUTPATIENT)
Dept: HEMATOLOGY/ONCOLOGY | Facility: CLINIC | Age: 65
End: 2025-01-31
Payer: COMMERCIAL

## 2025-01-31 DIAGNOSIS — Z79.810 ENCOUNTER FOR MONITORING TAMOXIFEN THERAPY: ICD-10-CM

## 2025-01-31 DIAGNOSIS — C50.511 MALIGNANT NEOPLASM OF LOWER-OUTER QUADRANT OF RIGHT BREAST OF FEMALE, ESTROGEN RECEPTOR POSITIVE: Primary | ICD-10-CM

## 2025-01-31 DIAGNOSIS — Z15.09 MONOALLELIC MUTATION OF ATM GENE: ICD-10-CM

## 2025-01-31 DIAGNOSIS — Z17.0 MALIGNANT NEOPLASM OF LOWER-OUTER QUADRANT OF RIGHT BREAST OF FEMALE, ESTROGEN RECEPTOR POSITIVE: Primary | ICD-10-CM

## 2025-01-31 DIAGNOSIS — Z15.01 MONOALLELIC MUTATION OF ATM GENE: ICD-10-CM

## 2025-01-31 DIAGNOSIS — Z15.89 MONOALLELIC MUTATION OF ATM GENE: ICD-10-CM

## 2025-01-31 DIAGNOSIS — Z51.81 ENCOUNTER FOR MONITORING TAMOXIFEN THERAPY: ICD-10-CM

## 2025-01-31 PROBLEM — Z85.3 HISTORY OF BREAST CANCER: Status: ACTIVE | Noted: 2022-05-03

## 2025-01-31 PROCEDURE — 3061F NEG MICROALBUMINURIA REV: CPT | Mod: CPTII,95,, | Performed by: INTERNAL MEDICINE

## 2025-01-31 PROCEDURE — 1159F MED LIST DOCD IN RCRD: CPT | Mod: CPTII,95,, | Performed by: INTERNAL MEDICINE

## 2025-01-31 PROCEDURE — 1160F RVW MEDS BY RX/DR IN RCRD: CPT | Mod: CPTII,95,, | Performed by: INTERNAL MEDICINE

## 2025-01-31 PROCEDURE — 98005 SYNCH AUDIO-VIDEO EST LOW 20: CPT | Mod: 95,,, | Performed by: INTERNAL MEDICINE

## 2025-01-31 PROCEDURE — 3051F HG A1C>EQUAL 7.0%<8.0%: CPT | Mod: CPTII,95,, | Performed by: INTERNAL MEDICINE

## 2025-01-31 PROCEDURE — 3066F NEPHROPATHY DOC TX: CPT | Mod: CPTII,95,, | Performed by: INTERNAL MEDICINE

## 2025-02-27 NOTE — PROGRESS NOTES
Subjective:       Patient ID: Jessie Greene is a 58 y.o. female.    Chief Complaint: No chief complaint on file.    Interval history:    Patient with CORBETT, no cirrhosis.  Due to a lot of concern for liver disease and family h/o pancreatic cancer (not hepatic), I am monitoring with annual u/s and AFP.  Liver disease well-compensated so far.    US abd 5/2019 looked good for the liver, no focal lesion.  Pancreatic duct was mentioned as being prominent.  So I had asked patient to get CT of pancreas.  I did not receive the CT report, but today, patient showed the report to me on her phone.  On the CT, Pancreas, liver, spleen looked normal. Bile duct also normal.  She did have a 4 mm stone in the kidney, so she had lithotripsy on Aug 15, 2019, and stent placed for a week.  She is still experiencing some symptoms, urologist told her a piece of the stone is still there.  She is seeing her urologist later today.      Today, patient gave symptoms of itching on the forehead, neck, lips, hands.  On the forehead, she has little blister-like bumps, and red spots on the neck.  These are unlikely to be from liver disease.  They may be due to contact dermatitis. Itching on hands and lips could be due to liver disease, as of may 2019 labs, only ALT mildly elevated 49, with normal Alk phos 120, T bili 0.4, and AST 25.  Lipid panel normal.       HPI at past visit:     CORBETT, sec to DM.  on insulin.  Had bridging fibrosis in the liver.  Has been on HCC surveillance once a year.  AFP normal. U/s done on 9/12/16: report not sent to me.      Symptoms are occasional muscular pains involving shoulders and chest.  Occasional right upper quadrant pain with sometimes shoots downwards towards her lower abdomen, sometimes left upper quadrant pain radiating downwards to the left lower abdomen.  No constipation       Review of Systems   Constitutional: Negative for appetite change, fatigue and unexpected weight change.   HENT: Negative for  Writer talked to pt wife to updated her on medications after review of labs.  Per Dr. Austin Pt is to stop glimepiride and restart metformin xl 500 1 tab daily for the first 30 days. After that pt should take 2 times daily. Pt should check blood sugars once daily.  Pt is to stop taking sodium tabs and recheck BMP in 1-2 weeks.  A reorder was also for stool softeners was also sent to [facility].    Message was sent to pt so they could refer back to the information.   dental problem, nosebleeds, sore throat and trouble swallowing.    Eyes: Negative for visual disturbance.   Respiratory: Negative for cough and shortness of breath.    Cardiovascular: Negative for leg swelling.   Gastrointestinal: Negative for abdominal distention and diarrhea.   Genitourinary: Negative for decreased urine volume and frequency.   Musculoskeletal: Positive for myalgias. Negative for joint swelling.   Skin: Negative for color change and rash.   Neurological: Negative for dizziness and weakness.   Hematological: Does not bruise/bleed easily.   Psychiatric/Behavioral: Negative for confusion and decreased concentration. The patient is not nervous/anxious.        Objective:      Physical Exam   Constitutional: She is oriented to person, place, and time. Vital signs are normal. She appears well-developed and well-nourished.   HENT:   Head: Normocephalic.   Eyes: Conjunctivae are normal. No scleral icterus.   Neck: No JVD present.   Abdominal: Soft. She exhibits no distension.   Musculoskeletal: Normal range of motion. She exhibits no edema.   Neurological: She is alert and oriented to person, place, and time. Coordination normal.   Skin: No rash noted.   Psychiatric: She has a normal mood and affect. Her behavior is normal. Judgment and thought content normal.       Assessment:       -   CORBETT with bridging fibrosis.- possible early cirrhosis.  Platelet count remains normal. Spleen has been normal size.    -  On HCC surveillance, getting AFP and U/S q 1 year. Next due in May 2020.  Recent U/s showed no mass in the liver.    Alpha-fetoprotein normal   Neutropenia - resolved. Was prob viral illness.    Platelet count has been normal.        Plan:   -  Continue low carbohydrate diet, and regular exercise.     -  Have blood count today, as we did not get plt ct last time.   -  CBC, CMP q 6 months, next in November 2019.     -  AFP and u/s q 1 year.  Next in May 2020.  -  Return 1 yr

## 2025-05-19 ENCOUNTER — LAB VISIT (OUTPATIENT)
Dept: LAB | Facility: HOSPITAL | Age: 65
End: 2025-05-19
Attending: INTERNAL MEDICINE
Payer: COMMERCIAL

## 2025-05-19 DIAGNOSIS — K21.9 GASTROESOPHAGEAL REFLUX DISEASE, UNSPECIFIED WHETHER ESOPHAGITIS PRESENT: ICD-10-CM

## 2025-05-19 DIAGNOSIS — E11.9 DIABETES MELLITUS WITHOUT COMPLICATION: Primary | ICD-10-CM

## 2025-05-19 DIAGNOSIS — R93.2 NONVISUALIZATION OF GALLBLADDER: ICD-10-CM

## 2025-05-19 DIAGNOSIS — K21.9 ESOPHAGEAL REFLUX: Primary | ICD-10-CM

## 2025-05-19 DIAGNOSIS — R93.2 ABNORMAL LIVER CT: ICD-10-CM

## 2025-05-19 LAB
CANCER AG19-9 SERPL-ACNC: 105.56 UNIT/ML (ref 0–37)
EST. AVERAGE GLUCOSE BLD GHB EST-MCNC: 157.1 MG/DL
HBA1C MFR BLD: 7.1 %

## 2025-05-19 PROCEDURE — 86301 IMMUNOASSAY TUMOR CA 19-9: CPT

## 2025-05-19 PROCEDURE — 83036 HEMOGLOBIN GLYCOSYLATED A1C: CPT

## 2025-05-19 PROCEDURE — 36415 COLL VENOUS BLD VENIPUNCTURE: CPT

## 2025-05-23 ENCOUNTER — HOSPITAL ENCOUNTER (OUTPATIENT)
Dept: RADIOLOGY | Facility: HOSPITAL | Age: 65
Discharge: HOME | End: 2025-05-23
Attending: INTERNAL MEDICINE
Payer: COMMERCIAL

## 2025-05-23 DIAGNOSIS — R93.2 NONVISUALIZATION OF GALLBLADDER: ICD-10-CM

## 2025-05-23 DIAGNOSIS — K21.9 ESOPHAGEAL REFLUX: ICD-10-CM

## 2025-05-23 PROCEDURE — 25500020 PHARM REV CODE 255: Performed by: INTERNAL MEDICINE

## 2025-05-23 PROCEDURE — 74183 MRI ABD W/O CNTR FLWD CNTR: CPT | Mod: TC

## 2025-05-23 PROCEDURE — A9577 INJ MULTIHANCE: HCPCS | Performed by: INTERNAL MEDICINE

## 2025-05-23 RX ADMIN — GADOBENATE DIMEGLUMINE 15 ML: 529 INJECTION, SOLUTION INTRAVENOUS at 10:05

## 2025-05-28 ENCOUNTER — LAB VISIT (OUTPATIENT)
Dept: LAB | Facility: HOSPITAL | Age: 65
End: 2025-05-28
Attending: INTERNAL MEDICINE
Payer: COMMERCIAL

## 2025-05-28 DIAGNOSIS — R10.9 STOMACH ACHE: Primary | ICD-10-CM

## 2025-05-28 DIAGNOSIS — R10.9 LEFT FLANK PAIN: ICD-10-CM

## 2025-05-28 LAB
BACTERIA #/AREA URNS AUTO: ABNORMAL /HPF
BILIRUB UR QL STRIP.AUTO: NEGATIVE
CLARITY UR: CLEAR
COLOR UR AUTO: ABNORMAL
GLUCOSE UR QL STRIP: ABNORMAL
HGB UR QL STRIP: NEGATIVE
KETONES UR QL STRIP: NEGATIVE
LEUKOCYTE ESTERASE UR QL STRIP: NEGATIVE
MUCOUS THREADS URNS QL MICRO: ABNORMAL /LPF
NITRITE UR QL STRIP: NEGATIVE
PH UR STRIP: 5 [PH]
PROT UR QL STRIP: NEGATIVE
RBC #/AREA URNS AUTO: ABNORMAL /HPF
SP GR UR STRIP.AUTO: 1.03 (ref 1–1.03)
SQUAMOUS #/AREA URNS LPF: ABNORMAL /HPF
UROBILINOGEN UR STRIP-ACNC: NORMAL
WBC #/AREA URNS AUTO: ABNORMAL /HPF

## 2025-05-28 PROCEDURE — 81001 URINALYSIS AUTO W/SCOPE: CPT

## 2025-06-16 ENCOUNTER — HOSPITAL ENCOUNTER (OUTPATIENT)
Dept: RADIOLOGY | Facility: HOSPITAL | Age: 65
Discharge: HOME | End: 2025-06-16
Attending: INTERNAL MEDICINE
Payer: COMMERCIAL

## 2025-06-16 DIAGNOSIS — M25.552 LEFT HIP PAIN: ICD-10-CM

## 2025-06-16 PROCEDURE — 73502 X-RAY EXAM HIP UNI 2-3 VIEWS: CPT | Mod: TC,LT

## 2025-07-18 ENCOUNTER — LAB REQUISITION (OUTPATIENT)
Dept: LAB | Facility: HOSPITAL | Age: 65
End: 2025-07-18
Payer: COMMERCIAL

## 2025-07-18 DIAGNOSIS — R97.8 OTHER ABNORMAL TUMOR MARKERS: ICD-10-CM

## 2025-07-18 DIAGNOSIS — Z80.0 FAMILY HISTORY OF MALIGNANT NEOPLASM OF DIGESTIVE ORGANS: ICD-10-CM

## 2025-07-18 DIAGNOSIS — R89.8 OTHER ABNORMAL FINDINGS IN SPECIMENS FROM OTHER ORGANS, SYSTEMS AND TISSUES: ICD-10-CM

## 2025-07-18 LAB — CANCER AG19-9 SERPL-ACNC: 27.46 UNIT/ML (ref 0–37)

## 2025-07-18 PROCEDURE — 86301 IMMUNOASSAY TUMOR CA 19-9: CPT | Performed by: NURSE PRACTITIONER
